# Patient Record
Sex: MALE | Race: WHITE
[De-identification: names, ages, dates, MRNs, and addresses within clinical notes are randomized per-mention and may not be internally consistent; named-entity substitution may affect disease eponyms.]

---

## 2019-12-30 ENCOUNTER — HOSPITAL ENCOUNTER (INPATIENT)
Dept: HOSPITAL 63 - ER | Age: 79
LOS: 30 days | Discharge: HOSPICE | DRG: 57 | End: 2020-01-29
Attending: PSYCHIATRY & NEUROLOGY | Admitting: PSYCHIATRY & NEUROLOGY
Payer: MEDICARE

## 2019-12-30 VITALS — BODY MASS INDEX: 20.16 KG/M2 | HEIGHT: 74 IN | WEIGHT: 157.12 LBS

## 2019-12-30 VITALS — DIASTOLIC BLOOD PRESSURE: 74 MMHG | SYSTOLIC BLOOD PRESSURE: 138 MMHG

## 2019-12-30 DIAGNOSIS — F32.9: ICD-10-CM

## 2019-12-30 DIAGNOSIS — Z91.81: ICD-10-CM

## 2019-12-30 DIAGNOSIS — F63.9: ICD-10-CM

## 2019-12-30 DIAGNOSIS — I48.0: ICD-10-CM

## 2019-12-30 DIAGNOSIS — F01.51: ICD-10-CM

## 2019-12-30 DIAGNOSIS — E86.0: ICD-10-CM

## 2019-12-30 DIAGNOSIS — J44.9: ICD-10-CM

## 2019-12-30 DIAGNOSIS — M19.90: ICD-10-CM

## 2019-12-30 DIAGNOSIS — H90.3: ICD-10-CM

## 2019-12-30 DIAGNOSIS — G20: ICD-10-CM

## 2019-12-30 DIAGNOSIS — Z79.82: ICD-10-CM

## 2019-12-30 DIAGNOSIS — F41.9: ICD-10-CM

## 2019-12-30 DIAGNOSIS — F02.81: ICD-10-CM

## 2019-12-30 DIAGNOSIS — Z79.899: ICD-10-CM

## 2019-12-30 DIAGNOSIS — H54.7: ICD-10-CM

## 2019-12-30 DIAGNOSIS — I10: ICD-10-CM

## 2019-12-30 DIAGNOSIS — G30.9: Primary | ICD-10-CM

## 2019-12-30 DIAGNOSIS — L03.113: ICD-10-CM

## 2019-12-30 DIAGNOSIS — Z66: ICD-10-CM

## 2019-12-30 DIAGNOSIS — Z51.5: ICD-10-CM

## 2019-12-30 LAB
ALBUMIN SERPL-MCNC: 3.5 G/DL (ref 3.4–5)
ALBUMIN/GLOB SERPL: 1 {RATIO} (ref 1–1.7)
ALP SERPL-CCNC: 81 U/L (ref 46–116)
ALT SERPL-CCNC: 24 U/L (ref 16–63)
AMPHETAMINE/METHAMPHETAMINE: (no result)
ANION GAP SERPL CALC-SCNC: 9 MMOL/L (ref 6–14)
APTT PPP: YELLOW S
AST SERPL-CCNC: 25 U/L (ref 15–37)
BACTERIA #/AREA URNS HPF: 0 /HPF
BARBITURATES UR-MCNC: (no result) UG/ML
BASOPHILS # BLD AUTO: 0.2 X10^3/UL (ref 0–0.2)
BASOPHILS NFR BLD: 2 % (ref 0–3)
BENZODIAZ UR-MCNC: (no result) UG/L
BILIRUB SERPL-MCNC: 1 MG/DL (ref 0.2–1)
BILIRUB UR QL STRIP: (no result)
BUN/CREAT SERPL: 20 (ref 6–20)
CA-I SERPL ISE-MCNC: 32 MG/DL (ref 8–26)
CALCIUM SERPL-MCNC: 8.6 MG/DL (ref 8.5–10.1)
CANNABINOIDS UR-MCNC: (no result) UG/L
CHLORIDE SERPL-SCNC: 110 MMOL/L (ref 98–107)
CO2 SERPL-SCNC: 26 MMOL/L (ref 21–32)
COCAINE UR-MCNC: (no result) NG/ML
CREAT SERPL-MCNC: 1.6 MG/DL (ref 0.7–1.3)
EOSINOPHIL NFR BLD: 0.1 X10^3/UL (ref 0–0.7)
EOSINOPHIL NFR BLD: 2 % (ref 0–3)
ERYTHROCYTE [DISTWIDTH] IN BLOOD BY AUTOMATED COUNT: 14.2 % (ref 11.5–14.5)
FIBRINOGEN PPP-MCNC: CLEAR MG/DL
GFR SERPLBLD BASED ON 1.73 SQ M-ARVRAT: 41.8 ML/MIN
GLOBULIN SER-MCNC: 3.6 G/DL (ref 2.2–3.8)
GLUCOSE SERPL-MCNC: 90 MG/DL (ref 70–99)
GLUCOSE UR STRIP-MCNC: (no result) MG/DL
HCT VFR BLD CALC: 41.8 % (ref 39–53)
HGB BLD-MCNC: 14.4 G/DL (ref 13–17.5)
LYMPHOCYTES # BLD: 1.9 X10^3/UL (ref 1–4.8)
LYMPHOCYTES NFR BLD AUTO: 23 % (ref 24–48)
MCH RBC QN AUTO: 31 PG (ref 25–35)
MCHC RBC AUTO-ENTMCNC: 35 G/DL (ref 31–37)
MCV RBC AUTO: 89 FL (ref 79–100)
METHADONE SERPL-MCNC: (no result) NG/ML
MONO #: 0.7 X10^3/UL (ref 0–1.1)
MONOCYTES NFR BLD: 9 % (ref 0–9)
NEUT #: 5.5 X10^3UL (ref 1.8–7.7)
NEUTROPHILS NFR BLD AUTO: 65 % (ref 31–73)
NITRITE UR QL STRIP: (no result)
OPIATES UR-MCNC: (no result) NG/ML
PCP SERPL-MCNC: (no result) MG/DL
PLATELET # BLD AUTO: 201 X10^3/UL (ref 140–400)
POTASSIUM SERPL-SCNC: 4 MMOL/L (ref 3.5–5.1)
PROT SERPL-MCNC: 7.1 G/DL (ref 6.4–8.2)
RBC # BLD AUTO: 4.72 X10^6/UL (ref 4.3–5.7)
RBC #/AREA URNS HPF: (no result) /HPF (ref 0–2)
SODIUM SERPL-SCNC: 145 MMOL/L (ref 136–145)
SP GR UR STRIP: >=1.03
SQUAMOUS #/AREA URNS LPF: (no result) /LPF
UROBILINOGEN UR-MCNC: 0.2 MG/DL
WBC # BLD AUTO: 8.5 X10^3/UL (ref 4–11)
WBC #/AREA URNS HPF: (no result) /HPF (ref 0–4)

## 2019-12-30 PROCEDURE — 86592 SYPHILIS TEST NON-TREP QUAL: CPT

## 2019-12-30 PROCEDURE — 82306 VITAMIN D 25 HYDROXY: CPT

## 2019-12-30 PROCEDURE — 81001 URINALYSIS AUTO W/SCOPE: CPT

## 2019-12-30 PROCEDURE — 82607 VITAMIN B-12: CPT

## 2019-12-30 PROCEDURE — 93005 ELECTROCARDIOGRAM TRACING: CPT

## 2019-12-30 PROCEDURE — 80307 DRUG TEST PRSMV CHEM ANLYZR: CPT

## 2019-12-30 PROCEDURE — 83540 ASSAY OF IRON: CPT

## 2019-12-30 PROCEDURE — 86140 C-REACTIVE PROTEIN: CPT

## 2019-12-30 PROCEDURE — 73130 X-RAY EXAM OF HAND: CPT

## 2019-12-30 PROCEDURE — 80164 ASSAY DIPROPYLACETIC ACD TOT: CPT

## 2019-12-30 PROCEDURE — 84436 ASSAY OF TOTAL THYROXINE: CPT

## 2019-12-30 PROCEDURE — 87086 URINE CULTURE/COLONY COUNT: CPT

## 2019-12-30 PROCEDURE — 85651 RBC SED RATE NONAUTOMATED: CPT

## 2019-12-30 PROCEDURE — 84480 ASSAY TRIIODOTHYRONINE (T3): CPT

## 2019-12-30 PROCEDURE — 84443 ASSAY THYROID STIM HORMONE: CPT

## 2019-12-30 PROCEDURE — 83036 HEMOGLOBIN GLYCOSYLATED A1C: CPT

## 2019-12-30 PROCEDURE — 82140 ASSAY OF AMMONIA: CPT

## 2019-12-30 PROCEDURE — 87186 SC STD MICRODIL/AGAR DIL: CPT

## 2019-12-30 PROCEDURE — 85027 COMPLETE CBC AUTOMATED: CPT

## 2019-12-30 PROCEDURE — 85025 COMPLETE CBC W/AUTO DIFF WBC: CPT

## 2019-12-30 PROCEDURE — 83550 IRON BINDING TEST: CPT

## 2019-12-30 PROCEDURE — 83735 ASSAY OF MAGNESIUM: CPT

## 2019-12-30 PROCEDURE — 80053 COMPREHEN METABOLIC PANEL: CPT

## 2019-12-30 PROCEDURE — 36415 COLL VENOUS BLD VENIPUNCTURE: CPT

## 2019-12-30 PROCEDURE — 73080 X-RAY EXAM OF ELBOW: CPT

## 2019-12-30 PROCEDURE — 82947 ASSAY GLUCOSE BLOOD QUANT: CPT

## 2019-12-30 PROCEDURE — 80061 LIPID PANEL: CPT

## 2019-12-30 PROCEDURE — P9612 CATHETERIZE FOR URINE SPEC: HCPCS

## 2019-12-30 RX ADMIN — DONEPEZIL HYDROCHLORIDE SCH MG: 10 TABLET ORAL at 20:35

## 2019-12-30 RX ADMIN — MAGNESIUM HYDROXIDE SCH MG: 400 SUSPENSION ORAL at 20:35

## 2019-12-30 RX ADMIN — MIRTAZAPINE SCH MG: 7.5 TABLET, FILM COATED ORAL at 20:33

## 2019-12-30 RX ADMIN — ACETAMINOPHEN SCH MG: 160 SOLUTION ORAL at 20:35

## 2019-12-30 RX ADMIN — LOSARTAN POTASSIUM SCH MG: 50 TABLET, FILM COATED ORAL at 20:34

## 2019-12-30 RX ADMIN — MELATONIN TAB 3 MG SCH MG: 3 TAB at 20:33

## 2019-12-30 NOTE — PHYS DOC
Adult General


Chief Complaint


Chief Complaint:  MEDICAL CLEARANCE





[facility]


HPI





Patient is an 79-year-old male who presents for medical clearance for senior 

behavioral health for reported behavioral disturbances. Patient reportedly 

getting more aggressive with staff and other patients. Additional history is bardales

ited as patient is not answering questions.[]





Review of Systems


Review of Systems





Constitutional: Denies fever or chills []


Respiratory: Denies cough or shortness of breath []


Cardiovascular: No additional information not addressed in HPI []


GI: No reported vomiting or diarrhea []


Integument: Denies rash or skin lesions []


Neurologic: Denies headache, focal weakness or sensory changes []





Unable to assess for full review of systems as patient is not answering 

questions.





Physical Exam


Physical Exam





Constitutional: Well developed, well nourished, no acute distress, non-toxic 

appearance. []


HENT: Normocephalic, atraumatic, bilateral external ears normal, oropharynx 

moist, no oral exudates, nose normal. []


Eyes: PERRLA, EOMI, conjunctiva normal, no discharge. [] 


Neck: Normal range of motion, no tenderness, supple, no stridor. [] 


Cardiovascular: Regular rate and rhythm[]


Lungs & Thorax:  Bilateral breath sounds clear to auscultation []


Abdomen: Bowel sounds normal, soft, no tenderness. [] 


Skin: Warm, dry, no erythema, no rash. [] 


Extremities: No tenderness, no cyanosis, no clubbing, ROM intact. [] 


Neurologic: Somnolent but arousable, no focal deficits noted. []





EKG


EKG


[]





Radiology/Procedures


Radiology/Procedures


[]





Course & Med Decision Making


Course & Med Decision Making


Pertinent Labs and Imaging studies reviewed. (See chart for details)





[]





Dragon Disclaimer


Dragon Disclaimer


This electronic medical record was generated, in whole or in part, using a voice

 recognition dictation system.





Departure


Departure:


Impression:  


   Primary Impression:  


   Dementia with behavioral disturbance


Disposition:  09 ADMITTED AS INPATIENT


Admitting Physician:  Other (Dr. Valentine)


Condition:  STABLE


Referrals:  


HERACLIO MARSH (PCP)





Problem Qualifiers








   Primary Impression:  


   Dementia with behavioral disturbance


   Dementia type:  unspecified type  Qualified Codes:  F03.91 - Unspecified 

   dementia with behavioral disturbance








SARA CARVALHO Jr. DO          Dec 30, 2019 16:09

## 2019-12-30 NOTE — EKG
[facility] [address], [place] [address]

Test Date:    2019               Test Time:    16:20:30

Pat Name:     SMILEY PILLAI              Department:   

Patient ID:   [id number]           Room:          

Gender:       M                        Technician:   

:          [id number]               Requested By: SARA CARVALHO

Order Number: [id number]            Reading MD:   Luis Justice MD

                                 Measurements

Intervals                              Axis          

Rate:         66                       P:            90

MN:           198                      QRS:          -47

QRSD:         132                      T:            47

QT:           450                                    

QTc:          474                                    

                           Interpretive Statements

SINUS RHYTHM

RBBB

LAD

Electronically Signed On 2020 10:37:51 CST by Luis Justice MD

## 2019-12-30 NOTE — PDOC
Exam


Note:


George Note:


Please also refer to the separate dictated note~for this date of service 

dictated separately. Discussed the patient with Nursing staff reviewed the 

chart.~Reviewed interim history and current functioning. Reviewed vital 

signs,~Labs/ Radiology~and current medications noted below. Continue current 

treatment with the changes noted in the dictated addendum note





Assessment:


Vital Signs/I&O:





                                   Vital Signs








  Date Time  Temp Pulse Resp B/P (MAP) Pulse Ox O2 Delivery O2 Flow Rate FiO2


 


12/30/19 20:34  64  138/74    


 


12/30/19 18:25 97.2  20  95   


 


12/30/19 15:35      Room Air  








Labs:





                                Laboratory Tests








Test


 12/30/19


15:51 12/30/19


16:25


 


White Blood Count


 8.5 x10^3/uL


(4.0-11.0) 





 


Red Blood Count


 4.72 x10^6/uL


(4.30-5.70) 





 


Hemoglobin


 14.4 g/dL


(13.0-17.5) 





 


Hematocrit


 41.8 %


(39.0-53.0) 





 


Mean Corpuscular Volume


 89 fL ()


 





 


Mean Corpuscular Hemoglobin 31 pg (25-35)   


 


Mean Corpuscular Hemoglobin


Concent 35 g/dL


(31-37) 





 


Red Cell Distribution Width


 14.2 %


(11.5-14.5) 





 


Platelet Count


 201 x10^3/uL


(140-400) 





 


Neutrophils (%) (Auto) 65 % (31-73)   


 


Lymphocytes (%) (Auto) 23 % (24-48)  L 


 


Monocytes (%) (Auto) 9 % (0-9)   


 


Eosinophils (%) (Auto) 2 % (0-3)   


 


Basophils (%) (Auto) 2 % (0-3)   


 


Neutrophils # (Auto)


 5.5 x10^3uL


(1.8-7.7) 





 


Lymphocytes # (Auto)


 1.9 x10^3/uL


(1.0-4.8) 





 


Monocytes # (Auto)


 0.7 x10^3/uL


(0.0-1.1) 





 


Eosinophils # (Auto)


 0.1 x10^3/uL


(0.0-0.7) 





 


Basophils # (Auto)


 0.2 x10^3/uL


(0.0-0.2) 





 


Sodium Level


 145 mmol/L


(136-145) 





 


Potassium Level


 4.0 mmol/L


(3.5-5.1) 





 


Chloride Level


 110 mmol/L


()  H 





 


Carbon Dioxide Level


 26 mmol/L


(21-32) 





 


Anion Gap 9 (6-14)   


 


Blood Urea Nitrogen


 32 mg/dL


(8-26)  H 





 


Creatinine


 1.6 mg/dL


(0.7-1.3)  H 





 


Estimated GFR


(Cockcroft-Gault) 41.8  


 





 


BUN/Creatinine Ratio 20 (6-20)   


 


Glucose Level


 90 mg/dL


(70-99) 





 


Calcium Level


 8.6 mg/dL


(8.5-10.1) 





 


Magnesium Level


 2.2 mg/dL


(1.8-2.4) 





 


Total Bilirubin


 1.0 mg/dL


(0.2-1.0) 





 


Aspartate Amino Transferase


(AST) 25 U/L (15-37)


 





 


Alanine Aminotransferase (ALT)


 24 U/L (16-63)


 





 


Alkaline Phosphatase


 81 U/L


() 





 


Total Protein


 7.1 g/dL


(6.4-8.2) 





 


Albumin


 3.5 g/dL


(3.4-5.0) 





 


Albumin/Globulin Ratio 1.0 (1.0-1.7)   


 


Ethyl Alcohol Level


 < 10 mg/dL


(0-10) 





 


Urine Collection Type  U cath  


 


Urine Color  Yellow  


 


Urine Clarity  Clear  


 


Urine pH  5.5  


 


Urine Specific Gravity  >=1.030  


 


Urine Protein


 


 Neg


(NEG-TRACE)


 


Urine Glucose (UA)


 


 Neg mg/dL


(NEG)


 


Urine Ketones (Stick)


 


 Neg mg/dL


(NEG)


 


Urine Blood  Trace (NEG)  


 


Urine Nitrite  Neg (NEG)  


 


Urine Bilirubin  Neg (NEG)  


 


Urine Urobilinogen Dipstick


 


 0.2 mg/dL (0.2


mg/dL)


 


Urine Leukocyte Esterase  Neg (NEG)  


 


Urine RBC


 


 1-2 /HPF (0-2)





 


Urine WBC


 


 1-4 /HPF (0-4)





 


Urine Squamous Epithelial


Cells 


 None /LPF  





 


Urine Transitional Epithelial


Cells 


 Few /LPF  





 


Urine Bacteria


 


 0 /HPF (0-FEW)





 


Urine Mucus  Slight /LPF  


 


Urine Opiates Screen  Neg (NEG)  


 


Urine Methadone Screen  Neg (NEG)  


 


Urine Barbiturates  Neg (NEG)  


 


Urine Phencyclidine Screen  Neg (NEG)  


 


Urine


Amphetamine/Methamphetamine 


 Neg (NEG)  





 


Urine Benzodiazepines Screen  Neg (NEG)  


 


Urine Cocaine Screen  Neg (NEG)  


 


Urine Cannabinoids Screen  Neg (NEG)  


 


Urine Ethyl Alcohol  Neg (NEG)  











Current Medications:


Meds:





Current Medications








 Medications


  (Trade)  Dose


 Ordered  Sig/Lamberto


 Route


 PRN Reason  Start Time


 Stop Time Status Last Admin


Dose Admin


 


 Acetaminophen


  (Tylenol Oral


 Soln)  650 mg  BID


 PO


   12/30/19 21:00


    12/30/19 20:35





 


 Donepezil HCl


  (Aricept)  22.5 mg  QHS


 PO


   12/30/19 21:00


    12/30/19 20:35





 


 Melatonin


  (Melatonin)  3 mg  QHS


 PO


   12/30/19 21:00


    12/30/19 20:33





 


 Mirtazapine


  (Remeron)  7.5 mg  QHS


 PO


   12/30/19 21:00


    12/30/19 20:33





 


 Olanzapine


  (ZyPREXA)  2.5 mg  BID


 PO


   12/30/19 21:00


    12/30/19 20:33





 


 Losartan Potassium


  (Cozaar)  50 mg  QHS


 PO


   12/30/19 21:00


    12/30/19 20:34





 


 Hydrochlorothiazide


  (Microzide)  12.5 mg  QHS


 PO


   12/30/19 21:00


    12/30/19 20:34











I have reviewed the current psychotropics carefully including drug interactions.

 Risk benefit ratio favors no change other than as noted in my dictated progress

note.





Diagnosis:


Problems:  


(1) Anxiety disorder


(2) Dementia with behavioral disturbance


(3) Dementia, vascular, with delusions


(4) Dementia, vascular, with depression


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


(7) Impulse control disorder











NIKHIL LAUREANO MD                 Dec 30, 2019 21:09

## 2019-12-31 VITALS — DIASTOLIC BLOOD PRESSURE: 81 MMHG | SYSTOLIC BLOOD PRESSURE: 140 MMHG

## 2019-12-31 LAB
CHOLEST/HDLC SERPL: 3 {RATIO}
HBA1C MFR BLD: 5.1 % (ref 4.8–5.6)
HDLC SERPL-MCNC: 43 MG/DL (ref 40–60)
LDLC: 97 MG/DL (ref 0–100)
THYROID STIM HORMONE (TSH): 2.1 UIU/ML (ref 0.36–3.74)
TRIGL SERPL-MCNC: 86 MG/DL (ref 0–150)
VLDLC: 17 MG/DL (ref 0–40)

## 2019-12-31 RX ADMIN — ACETAMINOPHEN SCH MG: 160 SOLUTION ORAL at 10:15

## 2019-12-31 RX ADMIN — DONEPEZIL HYDROCHLORIDE SCH MG: 10 TABLET ORAL at 21:01

## 2019-12-31 RX ADMIN — LOSARTAN POTASSIUM SCH MG: 50 TABLET, FILM COATED ORAL at 21:02

## 2019-12-31 RX ADMIN — MIRTAZAPINE SCH MG: 7.5 TABLET, FILM COATED ORAL at 21:01

## 2019-12-31 RX ADMIN — MAGNESIUM HYDROXIDE SCH MG: 400 SUSPENSION ORAL at 21:03

## 2019-12-31 RX ADMIN — MELATONIN TAB 3 MG SCH MG: 3 TAB at 21:02

## 2019-12-31 RX ADMIN — MEMANTINE HYDROCHLORIDE SCH MG: 10 TABLET ORAL at 21:02

## 2019-12-31 RX ADMIN — ASPIRIN 81 MG SCH MG: 81 TABLET ORAL at 10:15

## 2019-12-31 RX ADMIN — MEMANTINE HYDROCHLORIDE SCH MG: 10 TABLET ORAL at 10:14

## 2019-12-31 RX ADMIN — POTASSIUM CHLORIDE SCH MEQ: 1500 TABLET, EXTENDED RELEASE ORAL at 10:14

## 2019-12-31 RX ADMIN — ACETAMINOPHEN SCH MG: 160 SOLUTION ORAL at 21:02

## 2019-12-31 NOTE — PDOC
Exam


Note:


George Note:


Please also refer to the separate dictated note~for this date of service 

dictated separately.~Patient seen individually. Discussed the patient with 

Nursing staff reviewed the chart.~Reviewed interim history and current 

functioning. Reviewed vital signs,~Labs/ Radiology~and current medications noted

below. Continue current treatment with the changes noted in the dictated 

addendum note





Assessment:


Vital Signs/I&O:





                                   Vital Signs








  Date Time  Temp Pulse Resp B/P (MAP) Pulse Ox O2 Delivery O2 Flow Rate FiO2


 


12/31/19 21:02  47  140/81    


 


12/31/19 05:15 97.0  16  98   


 


12/30/19 15:35      Room Air  














                                    I & O   


 


 12/30/19 12/30/19 12/31/19





 15:00 23:00 07:00


 


Intake Total  0 ml 


 


Balance  0 ml 








Labs:





                                Laboratory Tests








Test


 12/31/19


19:31


 


Glucose (Fingerstick)


 84 mg/dL


(70-99)











Current Medications:


Meds:





Current Medications








 Medications


  (Trade)  Dose


 Ordered  Sig/Lamberto


 Route


 PRN Reason  Start Time


 Stop Time Status Last Admin


Dose Admin


 


 Amlodipine


 Besylate


  (Norvasc)  5 mg  DAILY


 PO


   12/31/19 09:00


    12/31/19 10:15





 


 Aspirin


  (Children'S


 Aspirin)  162 mg  DAILY


 PO


   12/31/19 09:00


    12/31/19 10:15





 


 Potassium Chloride


  (Klor-Con)  20 meq  DAILY


 PO


   12/31/19 09:00


    12/31/19 10:14





 


 Memantine


  (Namenda)  10 mg  BID


 PO


   12/31/19 09:00


    12/31/19 21:02











I have reviewed the current psychotropics carefully including drug interactions.

 Risk benefit ratio favors no change other than as noted in my dictated progress

note.





Diagnosis:


Problems:  


(1) Dementia with behavioral disturbance


(2) Anxiety disorder


(3) Dementia, vascular, with delusions


(4) Dementia, vascular, with depression


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


(7) Impulse control disorder











NIKHIL LAUREANO MD                 Dec 31, 2019 21:26
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
## 2020-01-01 VITALS — SYSTOLIC BLOOD PRESSURE: 131 MMHG | DIASTOLIC BLOOD PRESSURE: 78 MMHG

## 2020-01-01 VITALS — DIASTOLIC BLOOD PRESSURE: 53 MMHG | SYSTOLIC BLOOD PRESSURE: 109 MMHG

## 2020-01-01 LAB
T3 SERPL-MCNC: 73 NG/DL (ref 71–180)
T4 SERPL-MCNC: 6.8 UG/DL (ref 4.5–12)

## 2020-01-01 RX ADMIN — MAGNESIUM HYDROXIDE SCH MG: 400 SUSPENSION ORAL at 19:42

## 2020-01-01 RX ADMIN — DONEPEZIL HYDROCHLORIDE SCH MG: 10 TABLET ORAL at 19:42

## 2020-01-01 RX ADMIN — ASPIRIN 81 MG SCH MG: 81 TABLET ORAL at 08:49

## 2020-01-01 RX ADMIN — TRAZODONE HYDROCHLORIDE PRN MG: 50 TABLET, FILM COATED ORAL at 01:13

## 2020-01-01 RX ADMIN — LOSARTAN POTASSIUM SCH MG: 50 TABLET, FILM COATED ORAL at 19:54

## 2020-01-01 RX ADMIN — MEMANTINE HYDROCHLORIDE SCH MG: 10 TABLET ORAL at 19:42

## 2020-01-01 RX ADMIN — ACETAMINOPHEN SCH MG: 160 SOLUTION ORAL at 19:42

## 2020-01-01 RX ADMIN — SERTRALINE SCH MG: 25 TABLET, FILM COATED ORAL at 08:52

## 2020-01-01 RX ADMIN — MIRTAZAPINE SCH MG: 7.5 TABLET, FILM COATED ORAL at 19:43

## 2020-01-01 RX ADMIN — LOSARTAN POTASSIUM SCH MG: 50 TABLET, FILM COATED ORAL at 19:46

## 2020-01-01 RX ADMIN — POTASSIUM CHLORIDE SCH MEQ: 1500 TABLET, EXTENDED RELEASE ORAL at 08:49

## 2020-01-01 RX ADMIN — LOSARTAN POTASSIUM SCH MG: 50 TABLET, FILM COATED ORAL at 19:44

## 2020-01-01 RX ADMIN — MELATONIN TAB 3 MG SCH MG: 3 TAB at 19:44

## 2020-01-01 RX ADMIN — ACETAMINOPHEN SCH MG: 160 SOLUTION ORAL at 08:49

## 2020-01-01 RX ADMIN — MEMANTINE HYDROCHLORIDE SCH MG: 10 TABLET ORAL at 08:49

## 2020-01-01 NOTE — PDOC
Exam


Note:


George Note:


Please also refer to the separate dictated note~for this date of service 

dictated separately.~Patient seen individually. Discussed the patient with 

Nursing staff reviewed the chart.~Reviewed interim history and current 

functioning. Reviewed vital signs,~Labs/ Radiology~and current medications noted

below. Continue current treatment with the changes noted in the dictated 

addendum note





Assessment:


Vital Signs/I&O:





                                   Vital Signs








  Date Time  Temp Pulse Resp B/P (MAP) Pulse Ox O2 Delivery O2 Flow Rate FiO2


 


1/1/20 19:54    104/66    


 


1/1/20 15:39 97.6 75 18  96   


 


1/1/20 05:08      Room Air  














                                    I & O   


 


 12/31/19 12/31/19 1/1/20





 14:59 22:59 06:59


 


Intake Total 420 ml 0 ml 


 


Balance 420 ml 0 ml 











Current Medications:


Meds:





Current Medications








 Medications


  (Trade)  Dose


 Ordered  Sig/Lamberto


 Route


 PRN Reason  Start Time


 Stop Time Status Last Admin


Dose Admin


 


 Sertraline HCl


  (Zoloft)  25 mg  DAILY


 PO


   1/1/20 09:00


 1/4/20 08:59  1/1/20 08:52











I have reviewed the current psychotropics carefully including drug interactions.

 Risk benefit ratio favors no change other than as noted in my dictated progress

note.





Diagnosis:


Problems:  


(1) Dementia with behavioral disturbance


(2) Anxiety disorder


(3) Dementia, vascular, with delusions


(4) Dementia, vascular, with depression


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


(7) Impulse control disorder











NIKHIL LAUREANO MD                  Jan 1, 2020 20:49
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
## 2020-01-02 VITALS — DIASTOLIC BLOOD PRESSURE: 72 MMHG | SYSTOLIC BLOOD PRESSURE: 151 MMHG

## 2020-01-02 VITALS — DIASTOLIC BLOOD PRESSURE: 73 MMHG | SYSTOLIC BLOOD PRESSURE: 111 MMHG

## 2020-01-02 VITALS — DIASTOLIC BLOOD PRESSURE: 73 MMHG | SYSTOLIC BLOOD PRESSURE: 115 MMHG

## 2020-01-02 RX ADMIN — MAGNESIUM HYDROXIDE SCH MG: 400 SUSPENSION ORAL at 19:48

## 2020-01-02 RX ADMIN — MEMANTINE HYDROCHLORIDE SCH MG: 10 TABLET ORAL at 19:48

## 2020-01-02 RX ADMIN — POTASSIUM CHLORIDE SCH MEQ: 1500 TABLET, EXTENDED RELEASE ORAL at 10:33

## 2020-01-02 RX ADMIN — LOSARTAN POTASSIUM SCH MG: 50 TABLET, FILM COATED ORAL at 19:47

## 2020-01-02 RX ADMIN — MELATONIN TAB 3 MG SCH MG: 3 TAB at 19:47

## 2020-01-02 RX ADMIN — MIRTAZAPINE SCH MG: 7.5 TABLET, FILM COATED ORAL at 19:48

## 2020-01-02 RX ADMIN — ACETAMINOPHEN SCH MG: 160 SOLUTION ORAL at 10:33

## 2020-01-02 RX ADMIN — MEMANTINE HYDROCHLORIDE SCH MG: 10 TABLET ORAL at 10:34

## 2020-01-02 RX ADMIN — ASPIRIN 81 MG SCH MG: 81 TABLET ORAL at 10:33

## 2020-01-02 RX ADMIN — DONEPEZIL HYDROCHLORIDE SCH MG: 10 TABLET ORAL at 19:47

## 2020-01-02 RX ADMIN — SERTRALINE SCH MG: 25 TABLET, FILM COATED ORAL at 10:33

## 2020-01-02 RX ADMIN — ACETAMINOPHEN SCH MG: 160 SOLUTION ORAL at 19:48

## 2020-01-02 NOTE — PDOC
Exam


Note:


George Note:


Please also refer to the separate dictated note~for this date of service 

dictated separately.~Patient seen individually. Discussed the patient with 

Nursing staff reviewed the chart.~Reviewed interim history and current 

functioning. Reviewed vital signs,~Labs/ Radiology~and current medications noted

below. Continue current treatment with the changes noted in the dictated 

addendum note





Assessment:


Vital Signs/I&O:





                                   Vital Signs








  Date Time  Temp Pulse Resp B/P (MAP) Pulse Ox O2 Delivery O2 Flow Rate FiO2


 


1/2/20 19:47  83  115/73    


 


1/2/20 16:18 97.9  20  96 Room Air  














                                    I & O   


 


 1/1/20 1/1/20 1/2/20





 15:00 23:00 07:00


 


Intake Total 240 ml 240 ml 240 ml


 


Balance 240 ml 240 ml 240 ml











Current Medications:


I have reviewed the current psychotropics carefully including drug interactions.

 Risk benefit ratio favors no change other than as noted in my dictated progress

note.





Diagnosis:


Problems:  


(1) Dementia with behavioral disturbance


(2) Anxiety disorder


(3) Dementia, vascular, with delusions


(4) Dementia, vascular, with depression


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


(7) Impulse control disorder











NIKHIL LAUREANO MD                  Jan 2, 2020 21:09

## 2020-01-03 VITALS — SYSTOLIC BLOOD PRESSURE: 151 MMHG | DIASTOLIC BLOOD PRESSURE: 74 MMHG

## 2020-01-03 VITALS — DIASTOLIC BLOOD PRESSURE: 69 MMHG | SYSTOLIC BLOOD PRESSURE: 145 MMHG

## 2020-01-03 LAB
ALBUMIN SERPL-MCNC: 3.3 G/DL (ref 3.4–5)
ALBUMIN/GLOB SERPL: 0.9 {RATIO} (ref 1–1.7)
ALP SERPL-CCNC: 80 U/L (ref 46–116)
ALT SERPL-CCNC: 20 U/L (ref 16–63)
ANION GAP SERPL CALC-SCNC: 7 MMOL/L (ref 6–14)
AST SERPL-CCNC: 22 U/L (ref 15–37)
BASOPHILS # BLD AUTO: 0.1 X10^3/UL (ref 0–0.2)
BASOPHILS NFR BLD: 1 % (ref 0–3)
BILIRUB SERPL-MCNC: 0.7 MG/DL (ref 0.2–1)
BUN/CREAT SERPL: 27 (ref 6–20)
CA-I SERPL ISE-MCNC: 40 MG/DL (ref 8–26)
CALCIUM SERPL-MCNC: 8.7 MG/DL (ref 8.5–10.1)
CHLORIDE SERPL-SCNC: 108 MMOL/L (ref 98–107)
CO2 SERPL-SCNC: 31 MMOL/L (ref 21–32)
CREAT SERPL-MCNC: 1.5 MG/DL (ref 0.7–1.3)
EOSINOPHIL NFR BLD: 0.3 X10^3/UL (ref 0–0.7)
EOSINOPHIL NFR BLD: 4 % (ref 0–3)
ERYTHROCYTE [DISTWIDTH] IN BLOOD BY AUTOMATED COUNT: 14.2 % (ref 11.5–14.5)
GFR SERPLBLD BASED ON 1.73 SQ M-ARVRAT: 45 ML/MIN
GLOBULIN SER-MCNC: 3.7 G/DL (ref 2.2–3.8)
GLUCOSE SERPL-MCNC: 124 MG/DL (ref 70–99)
HCT VFR BLD CALC: 44.3 % (ref 39–53)
HGB BLD-MCNC: 14.7 G/DL (ref 13–17.5)
LYMPHOCYTES # BLD: 1.6 X10^3/UL (ref 1–4.8)
LYMPHOCYTES NFR BLD AUTO: 20 % (ref 24–48)
MCH RBC QN AUTO: 30 PG (ref 25–35)
MCHC RBC AUTO-ENTMCNC: 33 G/DL (ref 31–37)
MCV RBC AUTO: 91 FL (ref 79–100)
MONO #: 0.4 X10^3/UL (ref 0–1.1)
MONOCYTES NFR BLD: 5 % (ref 0–9)
NEUT #: 5.7 X10^3UL (ref 1.8–7.7)
NEUTROPHILS NFR BLD AUTO: 71 % (ref 31–73)
PLATELET # BLD AUTO: 199 X10^3/UL (ref 140–400)
POTASSIUM SERPL-SCNC: 3.8 MMOL/L (ref 3.5–5.1)
PROT SERPL-MCNC: 7 G/DL (ref 6.4–8.2)
RBC # BLD AUTO: 4.86 X10^6/UL (ref 4.3–5.7)
SODIUM SERPL-SCNC: 146 MMOL/L (ref 136–145)
WBC # BLD AUTO: 8.1 X10^3/UL (ref 4–11)

## 2020-01-03 RX ADMIN — MAGNESIUM HYDROXIDE SCH MG: 400 SUSPENSION ORAL at 19:53

## 2020-01-03 RX ADMIN — ASPIRIN 81 MG SCH MG: 81 TABLET ORAL at 08:46

## 2020-01-03 RX ADMIN — MELATONIN TAB 3 MG SCH MG: 3 TAB at 19:53

## 2020-01-03 RX ADMIN — SERTRALINE SCH MG: 25 TABLET, FILM COATED ORAL at 08:46

## 2020-01-03 RX ADMIN — TRAZODONE HYDROCHLORIDE PRN MG: 50 TABLET, FILM COATED ORAL at 19:52

## 2020-01-03 RX ADMIN — ACETAMINOPHEN SCH MG: 160 SOLUTION ORAL at 20:00

## 2020-01-03 RX ADMIN — POTASSIUM CHLORIDE SCH MEQ: 1500 TABLET, EXTENDED RELEASE ORAL at 08:46

## 2020-01-03 RX ADMIN — MIRTAZAPINE SCH MG: 7.5 TABLET, FILM COATED ORAL at 19:53

## 2020-01-03 RX ADMIN — ACETAMINOPHEN SCH MG: 160 SOLUTION ORAL at 08:44

## 2020-01-03 RX ADMIN — MEMANTINE HYDROCHLORIDE SCH MG: 10 TABLET ORAL at 08:46

## 2020-01-03 RX ADMIN — MEMANTINE HYDROCHLORIDE SCH MG: 10 TABLET ORAL at 19:53

## 2020-01-03 RX ADMIN — DONEPEZIL HYDROCHLORIDE SCH MG: 10 TABLET ORAL at 19:53

## 2020-01-03 NOTE — PN
DATE:  12/31/2019



PSYCHIATRIC PROGRESS NOTE



This late entry date of service 12/31/2019 covers the elements not covered in my

initial note.



SUBJECTIVE:  Per DERIAN Bailey, the patient slept 7 hours previous night.  He

remains in onesie, combative at times.  At 6:15, he received Ativan for this. 

We will change the Namenda XR to Namenda 10 mg twice a day.



REVIEW OF SYSTEMS:  No CV, , pulmonary, eye, ENT system symptoms on review. 

Extremely hard of hearing.



MENTAL STATUS EXAM:  Oriented to himself.  Insight, judgment, recent, and remote

memory, attention, concentration, fund of knowledge poor, consistent with his

diagnosis mentioned in my initial note.



PLAN:  Start Zoloft 25 mg a day and in 3 days, increase to 50 mg a day, change

Namenda as above, maintain Zyprexa 2.5 b.i.d., melatonin 3 mg at bedtime.  Rest

unchanged for now.





______________________________

MAN DELVIS LAUREANO MD



DR:  HARRY/ashly  JOB#:  [id number] / [id number]

DD:  01/02/2020 21:33  DT:  01/03/2020 01:13

## 2020-01-03 NOTE — PDOC
Exam


Note:


George Note:


Please also refer to the separate dictated note~for this date of service 

dictated separately.~Patient seen individually. Discussed the patient with 

Nursing staff reviewed the chart.~Reviewed interim history and current 

functioning. Reviewed vital signs,~Labs/ Radiology~and current medications noted

below. Continue current treatment with the changes noted in the dictated 

addendum note





Assessment:


Vital Signs/I&O:





                                   Vital Signs








  Date Time  Temp Pulse Resp B/P (MAP) Pulse Ox O2 Delivery O2 Flow Rate FiO2


 


1/3/20 15:45 98.0 57 16 151/74 (99) 96   


 


1/3/20 04:49      Room Air  














                                    I & O   


 


 1/2/20 1/2/20 1/3/20





 15:00 23:00 07:00


 


Intake Total 480 ml 600 ml 


 


Balance 480 ml 600 ml 








Labs:





                                Laboratory Tests








Test


 1/3/20


09:11


 


White Blood Count


 8.1 x10^3/uL


(4.0-11.0)


 


Red Blood Count


 4.86 x10^6/uL


(4.30-5.70)


 


Hemoglobin


 14.7 g/dL


(13.0-17.5)


 


Hematocrit


 44.3 %


(39.0-53.0)


 


Mean Corpuscular Volume


 91 fL ()





 


Mean Corpuscular Hemoglobin 30 pg (25-35)  


 


Mean Corpuscular Hemoglobin


Concent 33 g/dL


(31-37)


 


Red Cell Distribution Width


 14.2 %


(11.5-14.5)


 


Platelet Count


 199 x10^3/uL


(140-400)


 


Neutrophils (%) (Auto) 71 % (31-73)  


 


Lymphocytes (%) (Auto) 20 % (24-48)  L


 


Monocytes (%) (Auto) 5 % (0-9)  


 


Eosinophils (%) (Auto) 4 % (0-3)  H


 


Basophils (%) (Auto) 1 % (0-3)  


 


Neutrophils # (Auto)


 5.7 x10^3uL


(1.8-7.7)


 


Lymphocytes # (Auto)


 1.6 x10^3/uL


(1.0-4.8)


 


Monocytes # (Auto)


 0.4 x10^3/uL


(0.0-1.1)


 


Eosinophils # (Auto)


 0.3 x10^3/uL


(0.0-0.7)


 


Basophils # (Auto)


 0.1 x10^3/uL


(0.0-0.2)


 


Sodium Level


 146 mmol/L


(136-145)  H


 


Potassium Level


 3.8 mmol/L


(3.5-5.1)


 


Chloride Level


 108 mmol/L


()  H


 


Carbon Dioxide Level


 31 mmol/L


(21-32)


 


Anion Gap 7 (6-14)  


 


Blood Urea Nitrogen


 40 mg/dL


(8-26)  H


 


Creatinine


 1.5 mg/dL


(0.7-1.3)  H


 


Estimated GFR


(Cockcroft-Gault) 45.0  





 


BUN/Creatinine Ratio 27 (6-20)  H


 


Glucose Level


 124 mg/dL


(70-99)  H


 


Calcium Level


 8.7 mg/dL


(8.5-10.1)


 


Total Bilirubin


 0.7 mg/dL


(0.2-1.0)


 


Aspartate Amino Transferase


(AST) 22 U/L (15-37)





 


Alanine Aminotransferase (ALT)


 20 U/L (16-63)





 


Alkaline Phosphatase


 80 U/L


()


 


Total Protein


 7.0 g/dL


(6.4-8.2)


 


Albumin


 3.3 g/dL


(3.4-5.0)  L


 


Albumin/Globulin Ratio


 0.9 (1.0-1.7)


L











Current Medications:


Meds:





Current Medications








 Medications


  (Trade)  Dose


 Ordered  Sig/Lamberto


 Route


 PRN Reason  Start Time


 Stop Time Status Last Admin


Dose Admin


 


 Olanzapine


  (ZyPREXA ZYDIS)  2.5 mg  PRN Q2HR  PRN


 PO


 PSYCHOSIS  1/3/20 11:45


    1/3/20 19:53











I have reviewed the current psychotropics carefully including drug interactions.

 Risk benefit ratio favors no change other than as noted in my dictated progress

note.





Diagnosis:


Problems:  


(1) Dementia with behavioral disturbance


(2) Anxiety disorder


(3) Dementia, vascular, with delusions


(4) Dementia, vascular, with depression


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


(7) Impulse control disorder











NIKHIL LAUREANO MD                  Silverio 3, 2020 20:59

## 2020-01-03 NOTE — PN
DATE:  01/02/2020



This late entry 01/02/2020 covers elements not covered in my initial note.



SUBJECTIVE:  I met with the patient evening of 01/02/2020.  Per DERIAN Howe,

the patient slept 6-3/4 hours previous night.  He had a fall at 1:00 p.m.  No

injuries noted, bumped his left elbow, refused to wheelchair and walker,

resistive to toileting and being dressed.



REVIEW OF SYSTEMS:  Hard of hearing.  No CV, , pulmonary, eye system symptoms

on review.  Reliability poor.



MENTAL STATUS EXAM:  Oriented to himself.  Insight, judgment, recent and remote

memory, attention, concentration, fund of knowledge poor, consistent with his

diagnosis mentioned in my initial note.



PLAN:  Gradually increase the Zoloft.  Maintain rest of the psychotropics

unchanged.  He is not on any benzodiazepine that would predispose him to falls,

we will continue to monitor this closely.





______________________________

NIKHIL LAUREANO MD



DR:  HARRY/ashly  JOB#:  [id number] / [id number]

DD:  01/03/2020 09:09  DT:  01/03/2020 09:25

## 2020-01-03 NOTE — PN
DATE:  01/01/2020



PSYCHIATRIC PROGRESS NOTE



This late entry 01/01/2020 covers elements not covered in my initial note.



SUBJECTIVE:  I met with the patient evening of 01/01/2020.  Per DERIAN Gannon, the

patient slept 4-1/2 hours previous night.  He is extremely hard of hearing, also

confused, but the hard of hearing makes his confusion appear worse than it is. 

Takes his meds in [place] [facility].



REVIEW OF SYSTEMS:  No CV, , pulmonary, eye system symptoms on review.  Hard

of hearing.  Reliability poor.



MENTAL STATUS EXAM:  Oriented to himself.  Insight, judgment, recent and remote

memory, attention, concentration, fund of knowledge poor, consistent with his

diagnosis mentioned in my initial note.



PLAN:  No change from initial note.





______________________________

MAN DELVIS LAUREANO MD



DR:  HARRY/ashly  JOB#:  [id number] / [id number]

DD:  01/03/2020 09:08  DT:  01/03/2020 09:21

## 2020-01-04 VITALS — DIASTOLIC BLOOD PRESSURE: 72 MMHG | SYSTOLIC BLOOD PRESSURE: 174 MMHG

## 2020-01-04 RX ADMIN — DONEPEZIL HYDROCHLORIDE SCH MG: 23 TABLET, FILM COATED ORAL at 21:01

## 2020-01-04 RX ADMIN — MELATONIN TAB 3 MG SCH MG: 3 TAB at 20:57

## 2020-01-04 RX ADMIN — ACETAMINOPHEN SCH MG: 160 SOLUTION ORAL at 20:57

## 2020-01-04 RX ADMIN — MAGNESIUM HYDROXIDE SCH MG: 400 SUSPENSION ORAL at 21:00

## 2020-01-04 RX ADMIN — MIRTAZAPINE SCH MG: 7.5 TABLET, FILM COATED ORAL at 20:57

## 2020-01-04 RX ADMIN — MEMANTINE HYDROCHLORIDE SCH MG: 10 TABLET ORAL at 20:57

## 2020-01-04 RX ADMIN — ASPIRIN 81 MG SCH MG: 81 TABLET ORAL at 09:20

## 2020-01-04 RX ADMIN — TRAZODONE HYDROCHLORIDE PRN MG: 50 TABLET, FILM COATED ORAL at 20:57

## 2020-01-04 RX ADMIN — ACETAMINOPHEN SCH MG: 160 SOLUTION ORAL at 09:21

## 2020-01-04 RX ADMIN — MEMANTINE HYDROCHLORIDE SCH MG: 10 TABLET ORAL at 09:20

## 2020-01-04 RX ADMIN — POTASSIUM CHLORIDE SCH MEQ: 1500 TABLET, EXTENDED RELEASE ORAL at 09:20

## 2020-01-04 RX ADMIN — SERTRALINE HYDROCHLORIDE SCH MG: 50 TABLET ORAL at 09:22

## 2020-01-04 NOTE — PDOC
Exam


Note:


George Note:


Please also refer to the separate dictated note~for this date of service 

dictated separately.~Patient seen individually. Discussed the patient with 

Nursing staff reviewed the chart.~Reviewed interim history and current 

functioning. Reviewed vital signs,~Labs/ Radiology~and current medications noted

below. Continue current treatment with the changes noted in the dictated 

addendum note





Assessment:


Vital Signs/I&O:





                                   Vital Signs








  Date Time  Temp Pulse Resp B/P (MAP) Pulse Ox O2 Delivery O2 Flow Rate FiO2


 


1/4/20 09:21  67  174/72    


 


1/4/20 04:56 97.0  14  96   


 


1/3/20 04:49      Room Air  














                                    I & O   


 


 1/3/20 1/3/20 1/4/20





 15:00 23:00 07:00


 


Intake Total 600 ml 360 ml 


 


Balance 600 ml 360 ml 











Current Medications:


Meds:





Current Medications








 Medications


  (Trade)  Dose


 Ordered  Sig/Lamberto


 Route


 PRN Reason  Start Time


 Stop Time Status Last Admin


Dose Admin


 


 Sertraline HCl


  (Zoloft)  50 mg  DAILY


 PO


   1/4/20 09:00


    1/4/20 09:22





 


 Donepezil HCl


  (Aricept)  23 mg  QHS


 PO


   1/4/20 21:00


    1/4/20 21:01











I have reviewed the current psychotropics carefully including drug interactions.

 Risk benefit ratio favors no change other than as noted in my dictated progress

note.





Diagnosis:


Problems:  


(1) Dementia with behavioral disturbance


(2) Anxiety disorder


(3) Dementia, vascular, with delusions


(4) Dementia, vascular, with depression


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


(7) Impulse control disorder











NIKHIL LAUREANO MD                  Jan 4, 2020 21:22

## 2020-01-05 VITALS — DIASTOLIC BLOOD PRESSURE: 67 MMHG | SYSTOLIC BLOOD PRESSURE: 148 MMHG

## 2020-01-05 VITALS — SYSTOLIC BLOOD PRESSURE: 106 MMHG | DIASTOLIC BLOOD PRESSURE: 66 MMHG

## 2020-01-05 LAB
ALBUMIN SERPL-MCNC: 3.2 G/DL (ref 3.4–5)
ALBUMIN/GLOB SERPL: 0.8 {RATIO} (ref 1–1.7)
ALP SERPL-CCNC: 87 U/L (ref 46–116)
ALT SERPL-CCNC: 21 U/L (ref 16–63)
ANION GAP SERPL CALC-SCNC: 9 MMOL/L (ref 6–14)
AST SERPL-CCNC: 21 U/L (ref 15–37)
BILIRUB SERPL-MCNC: 0.6 MG/DL (ref 0.2–1)
BUN/CREAT SERPL: 26 (ref 6–20)
CA-I SERPL ISE-MCNC: 34 MG/DL (ref 8–26)
CALCIUM SERPL-MCNC: 8.3 MG/DL (ref 8.5–10.1)
CHLORIDE SERPL-SCNC: 107 MMOL/L (ref 98–107)
CO2 SERPL-SCNC: 27 MMOL/L (ref 21–32)
CREAT SERPL-MCNC: 1.3 MG/DL (ref 0.7–1.3)
CRP SERPL-MCNC: 50.5 MG/L (ref 0–3.3)
ERYTHROCYTE [DISTWIDTH] IN BLOOD BY AUTOMATED COUNT: 14.2 % (ref 11.5–14.5)
ERYTHROCYTE [SEDIMENTATION RATE] IN BLOOD: 45 MM/H (ref 0–15)
GFR SERPLBLD BASED ON 1.73 SQ M-ARVRAT: 53.1 ML/MIN
GLOBULIN SER-MCNC: 3.8 G/DL (ref 2.2–3.8)
GLUCOSE SERPL-MCNC: 117 MG/DL (ref 70–99)
HCT VFR BLD CALC: 42.3 % (ref 39–53)
HGB BLD-MCNC: 13.9 G/DL (ref 13–17.5)
MCH RBC QN AUTO: 30 PG (ref 25–35)
MCHC RBC AUTO-ENTMCNC: 33 G/DL (ref 31–37)
MCV RBC AUTO: 92 FL (ref 79–100)
PLATELET # BLD AUTO: 183 X10^3/UL (ref 140–400)
POTASSIUM SERPL-SCNC: 4.9 MMOL/L (ref 3.5–5.1)
PROT SERPL-MCNC: 7 G/DL (ref 6.4–8.2)
RBC # BLD AUTO: 4.62 X10^6/UL (ref 4.3–5.7)
SODIUM SERPL-SCNC: 143 MMOL/L (ref 136–145)
WBC # BLD AUTO: 11.6 X10^3/UL (ref 4–11)

## 2020-01-05 RX ADMIN — ASPIRIN 81 MG SCH MG: 81 TABLET ORAL at 09:14

## 2020-01-05 RX ADMIN — SERTRALINE HYDROCHLORIDE SCH MG: 50 TABLET ORAL at 09:15

## 2020-01-05 RX ADMIN — CHOLECALCIFEROL CAP 1.25 MG (50000 UNIT) SCH UNIT: 1.25 CAP at 09:14

## 2020-01-05 RX ADMIN — POTASSIUM CHLORIDE SCH MEQ: 1500 TABLET, EXTENDED RELEASE ORAL at 09:14

## 2020-01-05 RX ADMIN — MEMANTINE HYDROCHLORIDE SCH MG: 10 TABLET ORAL at 20:12

## 2020-01-05 RX ADMIN — DONEPEZIL HYDROCHLORIDE SCH MG: 23 TABLET, FILM COATED ORAL at 20:12

## 2020-01-05 RX ADMIN — MEMANTINE HYDROCHLORIDE SCH MG: 10 TABLET ORAL at 09:14

## 2020-01-05 RX ADMIN — TRAZODONE HYDROCHLORIDE PRN MG: 50 TABLET, FILM COATED ORAL at 20:12

## 2020-01-05 RX ADMIN — MAGNESIUM HYDROXIDE SCH MG: 400 SUSPENSION ORAL at 20:17

## 2020-01-05 RX ADMIN — ACETAMINOPHEN SCH MG: 160 SOLUTION ORAL at 09:14

## 2020-01-05 RX ADMIN — ACETAMINOPHEN SCH MG: 160 SOLUTION ORAL at 20:17

## 2020-01-05 RX ADMIN — MELATONIN TAB 3 MG SCH MG: 3 TAB at 20:12

## 2020-01-05 RX ADMIN — MIRTAZAPINE SCH MG: 7.5 TABLET, FILM COATED ORAL at 20:12

## 2020-01-05 NOTE — PDOC
Exam


Note:


George Note:


Please also refer to the separate dictated note~for this date of service 

dictated separately.~Patient seen individually. Discussed the patient with 

Nursing staff reviewed the chart.~Reviewed interim history and current 

functioning. Reviewed vital signs,~Labs/ Radiology~and current medications noted

below. Continue current treatment with the changes noted in the dictated 

addendum note





Assessment:


Vital Signs/I&O:





                                   Vital Signs








  Date Time  Temp Pulse Resp B/P (MAP) Pulse Ox O2 Delivery O2 Flow Rate FiO2


 


1/5/20 15:48 97.6 76 18 106/66 (79) 97   


 


1/3/20 04:49      Room Air  














                                    I & O   


 


 1/4/20 1/4/20 1/5/20





 15:00 23:00 07:00


 


Intake Total 240 ml 480 ml 


 


Balance 240 ml 480 ml 











Current Medications:


Meds:





Current Medications








 Medications


  (Trade)  Dose


 Ordered  Sig/Lamberto


 Route


 PRN Reason  Start Time


 Stop Time Status Last Admin


Dose Admin


 


 Donepezil HCl


  (Aricept)  23 mg  QHS


 PO


   1/4/20 21:00


    1/5/20 20:12











I have reviewed the current psychotropics carefully including drug interactions.

 Risk benefit ratio favors no change other than as noted in my dictated progress

note.





Diagnosis:


Problems:  


(1) Dementia with behavioral disturbance


(2) Anxiety disorder


(3) Dementia, vascular, with delusions


(4) Dementia, vascular, with depression


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


(7) Impulse control disorder











NIKHIL LAUREANO MD                  Jan 5, 2020 20:44

## 2020-01-06 VITALS — DIASTOLIC BLOOD PRESSURE: 81 MMHG | SYSTOLIC BLOOD PRESSURE: 136 MMHG

## 2020-01-06 RX ADMIN — MEMANTINE HYDROCHLORIDE SCH MG: 10 TABLET ORAL at 08:29

## 2020-01-06 RX ADMIN — MAGNESIUM HYDROXIDE SCH MG: 400 SUSPENSION ORAL at 19:40

## 2020-01-06 RX ADMIN — SERTRALINE HYDROCHLORIDE SCH MG: 50 TABLET ORAL at 08:29

## 2020-01-06 RX ADMIN — ACETAMINOPHEN SCH MG: 160 SOLUTION ORAL at 19:40

## 2020-01-06 RX ADMIN — POTASSIUM CHLORIDE SCH MEQ: 1500 TABLET, EXTENDED RELEASE ORAL at 08:29

## 2020-01-06 RX ADMIN — TRAZODONE HYDROCHLORIDE PRN MG: 50 TABLET, FILM COATED ORAL at 19:41

## 2020-01-06 RX ADMIN — MEMANTINE HYDROCHLORIDE SCH MG: 10 TABLET ORAL at 19:40

## 2020-01-06 RX ADMIN — ACETAMINOPHEN SCH MG: 160 SOLUTION ORAL at 08:29

## 2020-01-06 RX ADMIN — MELATONIN TAB 3 MG SCH MG: 3 TAB at 19:40

## 2020-01-06 RX ADMIN — MIRTAZAPINE SCH MG: 7.5 TABLET, FILM COATED ORAL at 19:40

## 2020-01-06 RX ADMIN — DONEPEZIL HYDROCHLORIDE SCH MG: 23 TABLET, FILM COATED ORAL at 19:40

## 2020-01-06 RX ADMIN — ASPIRIN 81 MG SCH MG: 81 TABLET ORAL at 08:29

## 2020-01-06 NOTE — PDOC
Exam


Note:


George Note:


Please also refer to the separate dictated note~for this date of service 

dictated separately.~Patient seen individually. Discussed the patient with 

Nursing staff reviewed the chart.~Reviewed interim history and current 

functioning. Reviewed vital signs,~Labs/ Radiology~and current medications noted

below. Continue current treatment with the changes noted in the dictated 

addendum note





Assessment:


Vital Signs/I&O:





                                   Vital Signs








  Date Time  Temp Pulse Resp B/P (MAP) Pulse Ox O2 Delivery O2 Flow Rate FiO2


 


1/6/20 15:52 98.2 79 19 136/81 (99) 98   


 


1/3/20 04:49      Room Air  














                                    I & O   


 


 1/5/20 1/5/20 1/6/20





 15:00 23:00 07:00


 


Intake Total 480 ml 220 ml 


 


Balance 480 ml 220 ml 








Labs:





                                Laboratory Tests








Test


 1/5/20


21:55


 


White Blood Count


 11.6 x10^3/uL


(4.0-11.0)  H


 


Red Blood Count


 4.62 x10^6/uL


(4.30-5.70)


 


Hemoglobin


 13.9 g/dL


(13.0-17.5)


 


Hematocrit


 42.3 %


(39.0-53.0)


 


Mean Corpuscular Volume


 92 fL ()





 


Mean Corpuscular Hemoglobin 30 pg (25-35)  


 


Mean Corpuscular Hemoglobin


Concent 33 g/dL


(31-37)


 


Red Cell Distribution Width


 14.2 %


(11.5-14.5)


 


Platelet Count


 183 x10^3/uL


(140-400)


 


Erythrocyte Sedimentation Rate 45 (0-15)  H


 


Sodium Level


 143 mmol/L


(136-145)


 


Potassium Level


 4.9 mmol/L


(3.5-5.1)


 


Chloride Level


 107 mmol/L


()


 


Carbon Dioxide Level


 27 mmol/L


(21-32)


 


Anion Gap 9 (6-14)  


 


Blood Urea Nitrogen


 34 mg/dL


(8-26)  H


 


Creatinine


 1.3 mg/dL


(0.7-1.3)


 


Estimated GFR


(Cockcroft-Gault) 53.1  





 


BUN/Creatinine Ratio 26 (6-20)  H


 


Glucose Level


 117 mg/dL


(70-99)  H


 


Calcium Level


 8.3 mg/dL


(8.5-10.1)  L


 


Total Bilirubin


 0.6 mg/dL


(0.2-1.0)


 


Aspartate Amino Transferase


(AST) 21 U/L (15-37)





 


Alanine Aminotransferase (ALT)


 21 U/L (16-63)





 


Alkaline Phosphatase


 87 U/L


()


 


C-Reactive Protein


 50.5 mg/L


(0-3.3)  H


 


Total Protein


 7.0 g/dL


(6.4-8.2)


 


Albumin


 3.2 g/dL


(3.4-5.0)  L


 


Albumin/Globulin Ratio


 0.8 (1.0-1.7)


L











Current Medications:


I have reviewed the current psychotropics carefully including drug interactions.

 Risk benefit ratio favors no change other than as noted in my dictated progress

note.





Diagnosis:


Problems:  


(1) Dementia with behavioral disturbance


(2) Anxiety disorder


(3) Dementia, vascular, with delusions


(4) Dementia, vascular, with depression


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


(7) Impulse control disorder











NIKHIL LAUREANO MD                  Jan 6, 2020 20:47

## 2020-01-06 NOTE — PN
DATE:  01/04/2020



PSYCHIATRIC PROGRESS NOTE



This late entry 01/04/2020 covers elements not covered in my initial note.



SUBJECTIVE:  I met with the patient evening of 01/04/2020.  The patient slept

7-3/4 hours previous night.  He gets agitated with showers, received Zyprexa,

this seemed to help.



REVIEW OF SYSTEMS:  No CV, , pulmonary, eye, ENT system symptoms on review. 

Reliability poor.



MENTAL STATUS EXAM:  Oriented to himself.  Insight, judgment, recent and remote

memory, attention, concentration, fund of knowledge poor, consistent with his

diagnosis mentioned in my initial note.



PLAN:  No change from initial note.





______________________________

MAN DELVIS LAUREANO MD



DR:  HARRY/ashly  JOB#:  [id number] / [id number]

DD:  01/05/2020 21:08  DT:  01/06/2020 00:41

## 2020-01-06 NOTE — PN
DATE:  01/03/2020



PSYCHIATRIC PROGRESS NOTE



This late entry of 01/03/2020 covers the elements not covered in my initial

note.



SUBJECTIVE:  I met with the patient in the evening of 01/03/2020 and staffed at

a treatment team meeting with the entire team earlier in the day with DERIAN Espinosa.  The patient slept 6 hours previous night.  Appetite 40%, resistive with

redirection, confused, but compliant.  He gets paranoid, agitated at times, very

hard of hearing, which worsens confusion.



REVIEW OF SYSTEMS:  No CV, , pulmonary, eye system symptoms on review. 

Reliability poor.



MENTAL STATUS EXAM:  Oriented to himself.  Insight, judgment, recent and remote

memory, attention, concentration, fund of knowledge poor, consistent with his

diagnosis mentioned in my initial note.



PLAN:  No change from initial note.  Start Zyprexa Zydis 2.5 mg q. 2 hours

p.r.n. psychosis, agitation, max 7.5 mg in 24 hours.  Continue rest of the

psychotropics unchanged.





______________________________

NIKHIL LAUREANO MD



DR:  HARRY/ashly  JOB#:  [id number] / [id number]

DD:  01/05/2020 21:07  DT:  01/06/2020 00:39

## 2020-01-07 VITALS — SYSTOLIC BLOOD PRESSURE: 90 MMHG | DIASTOLIC BLOOD PRESSURE: 72 MMHG

## 2020-01-07 VITALS — DIASTOLIC BLOOD PRESSURE: 50 MMHG | SYSTOLIC BLOOD PRESSURE: 130 MMHG

## 2020-01-07 VITALS — DIASTOLIC BLOOD PRESSURE: 70 MMHG | SYSTOLIC BLOOD PRESSURE: 132 MMHG

## 2020-01-07 LAB
ALBUMIN SERPL-MCNC: 3 G/DL (ref 3.4–5)
ALBUMIN/GLOB SERPL: 0.8 {RATIO} (ref 1–1.7)
ALP SERPL-CCNC: 93 U/L (ref 46–116)
ALT SERPL-CCNC: 20 U/L (ref 16–63)
ANION GAP SERPL CALC-SCNC: 6 MMOL/L (ref 6–14)
AST SERPL-CCNC: 21 U/L (ref 15–37)
BASOPHILS # BLD AUTO: 0 X10^3/UL (ref 0–0.2)
BASOPHILS NFR BLD: 0 % (ref 0–3)
BILIRUB SERPL-MCNC: 0.5 MG/DL (ref 0.2–1)
BUN/CREAT SERPL: 28 (ref 6–20)
CA-I SERPL ISE-MCNC: 37 MG/DL (ref 8–26)
CALCIUM SERPL-MCNC: 8.5 MG/DL (ref 8.5–10.1)
CHLORIDE SERPL-SCNC: 109 MMOL/L (ref 98–107)
CO2 SERPL-SCNC: 27 MMOL/L (ref 21–32)
CREAT SERPL-MCNC: 1.3 MG/DL (ref 0.7–1.3)
EOSINOPHIL NFR BLD: 0.3 X10^3/UL (ref 0–0.7)
EOSINOPHIL NFR BLD: 3 % (ref 0–3)
ERYTHROCYTE [DISTWIDTH] IN BLOOD BY AUTOMATED COUNT: 14.7 % (ref 11.5–14.5)
GFR SERPLBLD BASED ON 1.73 SQ M-ARVRAT: 53.1 ML/MIN
GLOBULIN SER-MCNC: 3.9 G/DL (ref 2.2–3.8)
GLUCOSE SERPL-MCNC: 111 MG/DL (ref 70–99)
HCT VFR BLD CALC: 42.3 % (ref 39–53)
HGB BLD-MCNC: 13.8 G/DL (ref 13–17.5)
LYMPHOCYTES # BLD: 2.1 X10^3/UL (ref 1–4.8)
LYMPHOCYTES NFR BLD AUTO: 20 % (ref 24–48)
MCH RBC QN AUTO: 30 PG (ref 25–35)
MCHC RBC AUTO-ENTMCNC: 33 G/DL (ref 31–37)
MCV RBC AUTO: 92 FL (ref 79–100)
MONO #: 0.9 X10^3/UL (ref 0–1.1)
MONOCYTES NFR BLD: 9 % (ref 0–9)
NEUT #: 6.9 X10^3UL (ref 1.8–7.7)
NEUTROPHILS NFR BLD AUTO: 67 % (ref 31–73)
PLATELET # BLD AUTO: 232 X10^3/UL (ref 140–400)
POTASSIUM SERPL-SCNC: 4.5 MMOL/L (ref 3.5–5.1)
PROT SERPL-MCNC: 6.9 G/DL (ref 6.4–8.2)
RBC # BLD AUTO: 4.57 X10^6/UL (ref 4.3–5.7)
SODIUM SERPL-SCNC: 142 MMOL/L (ref 136–145)
WBC # BLD AUTO: 10.3 X10^3/UL (ref 4–11)

## 2020-01-07 RX ADMIN — MELATONIN TAB 3 MG SCH MG: 3 TAB at 19:53

## 2020-01-07 RX ADMIN — MIRTAZAPINE SCH MG: 7.5 TABLET, FILM COATED ORAL at 19:52

## 2020-01-07 RX ADMIN — ACETAMINOPHEN SCH MG: 160 SOLUTION ORAL at 08:18

## 2020-01-07 RX ADMIN — SERTRALINE HYDROCHLORIDE SCH MG: 50 TABLET ORAL at 08:27

## 2020-01-07 RX ADMIN — DIVALPROEX SODIUM SCH MG: 125 CAPSULE, COATED PELLETS ORAL at 17:14

## 2020-01-07 RX ADMIN — ACETAMINOPHEN SCH MG: 160 SOLUTION ORAL at 19:52

## 2020-01-07 RX ADMIN — POTASSIUM CHLORIDE SCH MEQ: 1500 TABLET, EXTENDED RELEASE ORAL at 08:26

## 2020-01-07 RX ADMIN — DONEPEZIL HYDROCHLORIDE SCH MG: 23 TABLET, FILM COATED ORAL at 19:52

## 2020-01-07 RX ADMIN — MEMANTINE HYDROCHLORIDE SCH MG: 10 TABLET ORAL at 19:53

## 2020-01-07 RX ADMIN — MEMANTINE HYDROCHLORIDE SCH MG: 10 TABLET ORAL at 08:26

## 2020-01-07 RX ADMIN — ASPIRIN 81 MG SCH MG: 81 TABLET ORAL at 08:19

## 2020-01-07 RX ADMIN — QUETIAPINE FUMARATE SCH MG: 25 TABLET, FILM COATED ORAL at 17:16

## 2020-01-07 RX ADMIN — DIVALPROEX SODIUM SCH MG: 125 CAPSULE, COATED PELLETS ORAL at 08:18

## 2020-01-07 RX ADMIN — MAGNESIUM HYDROXIDE SCH MG: 400 SUSPENSION ORAL at 19:52

## 2020-01-07 NOTE — PN
DATE:  01/05/2020



PSYCHIATRIC PROGRESS NOTE



This late entry, 01/05/2020, covers elements not covered in my initial note.



SUBJECTIVE:  I met with the patient evening of 01/05/2020.  The patient slept 7

hours previous night.  He is extremely hard of hearing, some impairment of

ambulation.  No CV, , pulmonary, eye system symptoms on review.  He remains

confused.  He did well the previous night.  Per nursing report, takes

medications whole, but during the day on 01/05/2020, he was combative and

suddenly jerked the right arm of a nursing staff.



REVIEW OF SYSTEMS:  No CV, , pulmonary, eye system symptoms on review.  Hard

of hearing.  Reliability poor.



MENTAL STATUS EXAM:  Oriented to himself.  Insight, judgment, recent and remote

memory, attention, concentration, fund of knowledge poor, consistent with his

diagnosis.



IMPRESSION:  Major neurocognitive disorder; Alzheimer, vascular with delusion;

depression; behavioral disturbance; anxiety disorder, unspecified; impulse

control disorder, unspecified; hard of hearing.



PLAN:  Continue current psychotropics, Remeron 7.5 mg at bedtime, melatonin 3 mg

at bedtime, Ativan p.r.n., Aricept ____ mg a day, Namenda 10 mg b.i.d., Zyprexa

p.r.n., trazodone p.r.n., Zoloft 50 mg a day.  May consider Depakote as a mood

stabilizer depending on how he does over the next day or so with his aggression.





______________________________

NIKHIL LAUREANO MD



DR:  HARRY/ashly  JOB#:  [id number] / [id number]

DD:  01/06/2020 13:39  DT:  01/06/2020 22:28

## 2020-01-08 VITALS — DIASTOLIC BLOOD PRESSURE: 57 MMHG | SYSTOLIC BLOOD PRESSURE: 122 MMHG

## 2020-01-08 VITALS — DIASTOLIC BLOOD PRESSURE: 89 MMHG | SYSTOLIC BLOOD PRESSURE: 162 MMHG

## 2020-01-08 VITALS — DIASTOLIC BLOOD PRESSURE: 64 MMHG | SYSTOLIC BLOOD PRESSURE: 120 MMHG

## 2020-01-08 LAB
APTT PPP: YELLOW S
BACTERIA #/AREA URNS HPF: (no result) /HPF
BILIRUB UR QL STRIP: (no result)
FIBRINOGEN PPP-MCNC: CLEAR MG/DL
GLUCOSE UR STRIP-MCNC: (no result) MG/DL
NITRITE UR QL STRIP: (no result)
RBC #/AREA URNS HPF: (no result) /HPF (ref 0–2)
SP GR UR STRIP: 1.02
SQUAMOUS #/AREA URNS LPF: (no result) /LPF
UROBILINOGEN UR-MCNC: 0.2 MG/DL
WBC #/AREA URNS HPF: (no result) /HPF (ref 0–4)

## 2020-01-08 RX ADMIN — SERTRALINE HYDROCHLORIDE SCH MG: 50 TABLET ORAL at 08:19

## 2020-01-08 RX ADMIN — MEMANTINE HYDROCHLORIDE SCH MG: 10 TABLET ORAL at 19:55

## 2020-01-08 RX ADMIN — ACETAMINOPHEN SCH MG: 160 SOLUTION ORAL at 19:55

## 2020-01-08 RX ADMIN — QUETIAPINE FUMARATE SCH MG: 25 TABLET, FILM COATED ORAL at 12:11

## 2020-01-08 RX ADMIN — MIRTAZAPINE SCH MG: 7.5 TABLET, FILM COATED ORAL at 19:55

## 2020-01-08 RX ADMIN — DONEPEZIL HYDROCHLORIDE SCH MG: 23 TABLET, FILM COATED ORAL at 19:55

## 2020-01-08 RX ADMIN — QUETIAPINE FUMARATE SCH MG: 25 TABLET, FILM COATED ORAL at 17:33

## 2020-01-08 RX ADMIN — ACETAMINOPHEN SCH MG: 160 SOLUTION ORAL at 08:18

## 2020-01-08 RX ADMIN — MEMANTINE HYDROCHLORIDE SCH MG: 10 TABLET ORAL at 08:19

## 2020-01-08 RX ADMIN — NAPROXEN SCH MG: 250 TABLET ORAL at 19:55

## 2020-01-08 RX ADMIN — MAGNESIUM HYDROXIDE SCH MG: 400 SUSPENSION ORAL at 19:55

## 2020-01-08 RX ADMIN — DIVALPROEX SODIUM SCH MG: 125 CAPSULE, COATED PELLETS ORAL at 08:18

## 2020-01-08 RX ADMIN — DIVALPROEX SODIUM SCH MG: 125 CAPSULE, COATED PELLETS ORAL at 17:33

## 2020-01-08 RX ADMIN — QUETIAPINE FUMARATE SCH MG: 25 TABLET, FILM COATED ORAL at 08:19

## 2020-01-08 RX ADMIN — POTASSIUM CHLORIDE SCH MEQ: 1500 TABLET, EXTENDED RELEASE ORAL at 08:19

## 2020-01-08 RX ADMIN — ASPIRIN 81 MG SCH MG: 81 TABLET ORAL at 08:19

## 2020-01-08 RX ADMIN — MELATONIN TAB 3 MG SCH MG: 3 TAB at 19:55

## 2020-01-08 NOTE — PDOC
Exam


Note:


George Note:


Please also refer to the separate dictated note~for this date of service 

dictated separately.~Patient seen individually. Discussed the patient with 

Nursing staff reviewed the chart.~Reviewed interim history and current 

functioning. Reviewed vital signs,~Labs/ Radiology~and current medications noted

below. Continue current treatment with the changes noted in the dictated 

addendum note





Assessment:


Vital Signs/I&O:





                                   Vital Signs








  Date Time  Temp Pulse Resp B/P (MAP) Pulse Ox O2 Delivery O2 Flow Rate FiO2


 


1/8/20 15:48 97.4 72 21 162/89 (113) 93 Room Air  














                                    I & O   


 


 1/7/20 1/7/20 1/8/20





 15:00 23:00 07:00


 


Intake Total 600 ml 360 ml 240 ml


 


Balance 600 ml 360 ml 240 ml








Labs:





                                Laboratory Tests








Test


 1/8/20


07:50


 


Urine Collection Type Unknown  


 


Urine Color Yellow  


 


Urine Clarity Clear  


 


Urine pH 7.0  


 


Urine Specific Gravity 1.025  


 


Urine Protein


 Neg


(NEG-TRACE)


 


Urine Glucose (UA)


 Neg mg/dL


(NEG)


 


Urine Ketones (Stick)


 15 mg/dL (NEG)





 


Urine Blood Neg (NEG)  


 


Urine Nitrite Neg (NEG)  


 


Urine Bilirubin Neg (NEG)  


 


Urine Urobilinogen Dipstick


 0.2 mg/dL (0.2


mg/dL)


 


Urine Leukocyte Esterase Neg (NEG)  


 


Urine RBC


 1-2 /HPF (0-2)





 


Urine WBC


 5-10 /HPF


(0-4)


 


Urine Squamous Epithelial


Cells Few /LPF  





 


Urine Bacteria


 Mod /HPF


(0-FEW)


 


Urine Mucus Mod /LPF  











Current Medications:


Meds:





Current Medications








 Medications


  (Trade)  Dose


 Ordered  Sig/Lamberto


 Route


 PRN Reason  Start Time


 Stop Time Status Last Admin


Dose Admin


 


 Naproxen


  (Naprosyn)  250 mg  BID


 PO


   1/8/20 21:00


    1/8/20 19:55











I have reviewed the current psychotropics carefully including drug interactions.

 Risk benefit ratio favors no change other than as noted in my dictated progress

note.





Diagnosis:


Problems:  


(1) Dementia with behavioral disturbance


(2) Anxiety disorder


(3) Dementia, vascular, with delusions


(4) Dementia, vascular, with depression


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


(7) Impulse control disorder











NIKHIL LAUREANO MD                  Jan 8, 2020 20:57

## 2020-01-08 NOTE — RAD
3 view study right elbow

 

Clinical indications: Bruising.

 

FINDINGS: No joint effusion is seen. No acute fracture or dislocation or 

lytic process is seen. Prominent traction spur of the olecranon is seen at

the attachment of the triceps tendon. No significant soft tissue 

thickening of the olecranon bursa is evident.

 

IMPRESSION: No acute osseous abnormality.

 

Electronically signed by: Ilan Chen MD (1/8/2020 9:51 AM) [id number]

## 2020-01-08 NOTE — PN
DATE:  01/06/2020



This late entry 01/06 covers elements not covered in my initial note.



SUBJECTIVE:  I met with the patient in the evening of 01/06.  The patient slept

6-3/4 hours previous night per DERIAN Espinosa.  He did well in the morning, but by

the afternoon, he was confused, disrobing in the day room, extremely hard of

hearing, which makes his confusion worse.  He received Zyprexa p.r.n.  Door

checking previous night.  He has some cellulitis, with increased white cell

count, sed rate and CRP.  Will defer to Dr. Schreiber.



REVIEW OF SYSTEMS:  No CV, , pulmonary, eye, ENT system symptoms on review. 

Reliability poor.



MENTAL STATUS EXAMINATION:  Oriented to himself.  Insight, judgment, recent and

remote memory, attention, concentration, fund of knowledge poor, consistent with

his diagnosis mentioned in my initial note.

 

PLAN:  No change from initial note.  Treat the cellulitis.  We will go ahead and

add Depakote Sprinkles 125 mg 9 a.m., 5:00 p.m.  Check CBC, CMP, valproic acid

level in 3 days.  Maintain Remeron 7.5 at bedtime, melatonin 3 mg at bedtime,

Aricept 22.5 mg at bedtime, Namenda 10 mg b.i.d., Zyprexa p.r.n., trazodone

p.r.n., Zoloft 50 mg a day.





______________________________

NIKHIL LAUREAON MD



DR:  HARRY/ashly  JOB#:  [id number] / [id number]

DD:  01/07/2020 16:16  DT:  01/07/2020 22:59

## 2020-01-09 VITALS — DIASTOLIC BLOOD PRESSURE: 57 MMHG | SYSTOLIC BLOOD PRESSURE: 109 MMHG

## 2020-01-09 VITALS — SYSTOLIC BLOOD PRESSURE: 127 MMHG | DIASTOLIC BLOOD PRESSURE: 62 MMHG

## 2020-01-09 RX ADMIN — NAPROXEN SCH MG: 250 TABLET ORAL at 10:42

## 2020-01-09 RX ADMIN — MEMANTINE HYDROCHLORIDE SCH MG: 10 TABLET ORAL at 10:43

## 2020-01-09 RX ADMIN — QUETIAPINE FUMARATE SCH MG: 25 TABLET, FILM COATED ORAL at 13:35

## 2020-01-09 RX ADMIN — DIVALPROEX SODIUM SCH MG: 125 CAPSULE, COATED PELLETS ORAL at 10:43

## 2020-01-09 RX ADMIN — DIVALPROEX SODIUM SCH MG: 125 CAPSULE, COATED PELLETS ORAL at 17:01

## 2020-01-09 RX ADMIN — ACETAMINOPHEN SCH MG: 160 SOLUTION ORAL at 10:41

## 2020-01-09 RX ADMIN — MEMANTINE HYDROCHLORIDE SCH MG: 10 TABLET ORAL at 19:36

## 2020-01-09 RX ADMIN — MIRTAZAPINE SCH MG: 7.5 TABLET, FILM COATED ORAL at 19:36

## 2020-01-09 RX ADMIN — MELATONIN TAB 3 MG SCH MG: 3 TAB at 19:36

## 2020-01-09 RX ADMIN — TRAZODONE HYDROCHLORIDE PRN MG: 50 TABLET, FILM COATED ORAL at 19:36

## 2020-01-09 RX ADMIN — SERTRALINE HYDROCHLORIDE SCH MG: 50 TABLET ORAL at 10:43

## 2020-01-09 RX ADMIN — DONEPEZIL HYDROCHLORIDE SCH MG: 23 TABLET, FILM COATED ORAL at 19:36

## 2020-01-09 RX ADMIN — ASPIRIN 81 MG SCH MG: 81 TABLET ORAL at 10:41

## 2020-01-09 RX ADMIN — POTASSIUM CHLORIDE SCH MEQ: 1500 TABLET, EXTENDED RELEASE ORAL at 10:43

## 2020-01-09 RX ADMIN — QUETIAPINE FUMARATE SCH MG: 25 TABLET, FILM COATED ORAL at 10:43

## 2020-01-09 RX ADMIN — NAPROXEN SCH MG: 250 TABLET ORAL at 19:36

## 2020-01-09 RX ADMIN — MAGNESIUM HYDROXIDE SCH MG: 400 SUSPENSION ORAL at 19:36

## 2020-01-09 RX ADMIN — QUETIAPINE FUMARATE SCH MG: 25 TABLET, FILM COATED ORAL at 17:01

## 2020-01-09 RX ADMIN — ACETAMINOPHEN SCH MG: 160 SOLUTION ORAL at 19:36

## 2020-01-09 NOTE — PN
DATE:  01/07/2020



PSYCHIATRIC PROGRESS NOTE



This late entry 01/07/2020 covers elements not covered in my initial note.



SUBJECTIVE:  I met with the patient evening of 01/07/2020.  Per DERIAN Gutiérrez, the

patient slept 9 hours previous night.  He has had a very difficult day all day,

combative, resistive to cares, drowsy at times.  Blood pressure was somewhat

low, seems to have a cellulitis on right elbow and Dr. Schreiber is following this. 

UA has been checked.  Chemistry profile awaited.



REVIEW OF SYSTEMS:  No CV, , pulmonary, eye, ENT system symptoms on review. 

Reliability poor.



MENTAL STATUS EXAM:  Oriented to himself.  Insight, judgment, recent and remote

memory, attention, concentration, fund of knowledge poor, consistent with his

diagnosis.  He is restless, anxious, paranoid, labile.



LABORATORY DATA:  Reviewed.



IMPRESSION:  Major neurocognitive disorder, Alzheimer, vascular with delusion,

depression, behavioral disturbance; anxiety disorder, unspecified; impulse

control disorder, unspecified.



PLAN:  Check UA.  Check chemistry profile.  Start Seroquel 12.5 mg 9 a.m., 1:00

p.m., 5:00 p.m. as a mood stabilizer and for psychotic symptoms, agitation and

aggression.  Rest unchanged per initial note.





______________________________

MAN DELVIS LAUREANO MD



DR:  HARRY/ashly  JOB#:  [id number] / [id number]

DD:  01/08/2020 17:23  DT:  01/09/2020 03:07

## 2020-01-09 NOTE — TX PLAN
Interdisciplinary Tx Plan


Admission Information


Dec 30, 2019 at 17:58


Legal Status (on Admission):  Voluntary


DPOA/Guardian Name:  Srikanth Dietrich


Contact Phone Number:  [phone]


Other Contact Name:  [facility])


Other Contact Phone:  [phone]


Verified Code Status:  DNR


Allergies:  


Coded Allergies:  


     No Known Drug Allergies (Unverified , 12/30/19)





Diagnoses


Reasons for Admission:  Aggressive, Agitated, Combative, Poor impulse control


Problem in Patient's Words:  


Part of the dx natural progression


Additional Admission Comments:  


According to the intake, pt is wandering,  


resistive to medications, exit seeking, combative  


to staff, aggressive at times with redirection,  


hitting staff, pushing them in the wall and  


throwing people's belongings.





Problems


Active Problems:  


Non-compliant with meds


Wanders


physical aggression/combative


Inactive Problems:  


n/a





Pt Strengths/Limitations


Ability for [place]:  Poor


Cognitive Functioning/Ability:  Poor


Communication Skills/Ability:  Poor


Financial Resources:  Fair


Insight/Judgement:  Poor


Intellectual Ability:  Poor


Physical Health:  Poor


Social Skills:  Poor


Stability in Family:  Good


Stability in School/Work:  Poor


Verbal Skills:  Poor





Discharge Criteria


Discharge Criteria:  Adequate arrangements @DC, Improved behavior, Improved moo

d/thought





Preliminary Discharge Plan


Preliminary DC Plan:  Current Living Arrange.





Special Precautions


Fall Risk:  Low





Initial D/C Plan





Pt will plan to discharge back to [facility] ([facility]


Identified Discharge Needs:  


Pt needs a psychiatrist to follow up with  


psychiatric medications.





Currently Utilized Resources


Currently Utilized Resources/P:  


Has a primary care through the facility


Referrals Community Resources:  


potential for psychiatrist at the time of discharge.





Identified Problems/Hx/Goals


Objectives/Short-Term Goals


Short Term Goals:  Dec. Aggression, Dec. Outbursts, Improved Social Skills, 

Medication Stabilization


Short Term Goals in Patient's:  


Medication management


Behavioral management





Interventions/Frequency


Staff Interventions/Frequency&:  


Psychiatrist to see pt at least 3x per week.


Social work to see pt at least 2x per week.


Nursing to complete 15 minute checks daily.


Encourage group activities.





History


Vocational History:  


Owned a full-service station


Education:  


Graduated High School





Treatment Plan Explained


Patient/Representative had this treatment plan explained to him/her as indicated

by the signature below and


has been given the opportunity to ask questions and make suggestions:











Date:  





Patient/Representative Signature: _____________________________________________


Patient/Representative Decline:  Yes (understands the plan; asks to just be 

updated )











NANDO PECK                   Jan 2, 2020 18:58
Interdisciplinary Tx Plan


Admission Information


Dec 30, 2019 at 17:58


Legal Status (on Admission):  Voluntary


DPOA/Guardian Name:  Srikanth Dietrich


Contact Phone Number:  [phone]


Other Contact Name:  [facility])


Other Contact Phone:  [phone]


Verified Code Status:  DNR


Allergies:  


Coded Allergies:  


     No Known Drug Allergies (Unverified , 12/30/19)





Diagnoses


Reasons for Admission:  Aggressive, Agitated, Combative, Poor impulse control


Problem in Patient's Words:  


Part of the dx natural progression


Additional Admission Comments:  


According to the intake, pt is wandering,  


resistive to medications, exit seeking, combative  


to staff, aggressive at times with redirection,  


hitting staff, pushing them in the wall and  


throwing people's belongings.





Problems


Active Problems:  


Non-compliant with meds


Wanders


physical aggression/combative


Inactive Problems:  


n/a





Pt Strengths/Limitations


Ability for [place]:  Poor


Cognitive Functioning/Ability:  Poor


Communication Skills/Ability:  Poor


Financial Resources:  Fair


Insight/Judgement:  Poor


Intellectual Ability:  Poor


Physical Health:  Poor


Social Skills:  Poor


Stability in Family:  Good


Stability in School/Work:  Poor


Verbal Skills:  Poor





Discharge Criteria


Discharge Criteria:  Adequate arrangements @DC, Improved behavior, Improved moo

d/thought





Preliminary Discharge Plan


Preliminary DC Plan:  Current Living Arrange.





Special Precautions


Fall Risk:  Low





Initial D/C Plan





Pt will plan to discharge back to [facility]


Identified Discharge Needs:  


Pt needs a psychiatrist to follow up with  


psychiatric medications.





Currently Utilized Resources


Currently Utilized Resources/P:  


Has a primary care through the facility


Referrals Community Resources:  


potential for psychiatrist at the time of discharge.





Identified Problems/Hx/Goals


Objectives/Short-Term Goals


Short Term Goals:  Dec. Aggression, Dec. Outbursts, Improved Social Skills, 

Medication Stabilization


Short Term Goals in Patient's:  


Medication management


Behavioral management





Interventions/Frequency


Staff Interventions/Frequency&:  


Psychiatrist to see pt at least 3x per week.


Social work to see pt at least 2x per week.


Nursing to complete 15 minute checks daily.


Encourage group activities.





History


Vocational History:  


Owned a full-service station


Education:  


Graduated High School





Treatment Plan Explained


Patient/Representative had this treatment plan explained to him/her as indicated

by the signature below and


has been given the opportunity to ask questions and make suggestions:











Date:  





Patient/Representative Signature: _____________________________________________





Status Update


Update


Pt is eating up to 75% of meals and sleeping 7 hours on average.  Pt will take 

medications in chocolate or vanilla boost and is mostly compliant.  Pt is found 

sleeping a lot during the day in random places (i.e. chairs in the hallway).  Pt

has little to no group participation; may be more beneficial to complete 1:1 

activities.  With pt current behavior, he may not be able to discharge back to 

[facility], as his aggression and behaviors have yet to decrease.  Pt is on 

Depakote 125mg q BID and Seroquel 12.5 mg TID.  Labs are to be drawn Friday 

morning.  SW will continue to follow up with pt son and the facility with 

updates.  ELOS is for 10 days.











NANDO PECK                   Jan 9, 2020 16:05
Detail Level: Detailed

## 2020-01-09 NOTE — PDOC
Exam


Note:


George Note:


Please also refer to the separate dictated note~for this date of service 

dictated separately.~Patient seen individually. Discussed the patient with 

Nursing staff reviewed the chart.~Reviewed interim history and current 

functioning. Reviewed vital signs,~Labs/ Radiology~and current medications noted

below. Continue current treatment with the changes noted in the dictated 

addendum note





Assessment:


Vital Signs/I&O:





                                   Vital Signs








  Date Time  Temp Pulse Resp B/P (MAP) Pulse Ox O2 Delivery O2 Flow Rate FiO2


 


1/9/20 15:29 97.7 68 16 127/62 (83) 95 Room Air  














                                    I & O   


 


 1/8/20 1/8/20 1/9/20





 15:00 23:00 07:00


 


Intake Total 360 ml 240 ml 


 


Balance 360 ml 240 ml 











Current Medications:


I have reviewed the current psychotropics carefully including drug interactions.

 Risk benefit ratio favors no change other than as noted in my dictated progress

note.





Diagnosis:


Problems:  


(1) Dementia with behavioral disturbance


(2) Anxiety disorder


(3) Dementia, vascular, with delusions


(4) Dementia, vascular, with depression


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


(7) Impulse control disorder











NIKHIL LAUREANO MD                  Jan 9, 2020 21:04

## 2020-01-10 VITALS — DIASTOLIC BLOOD PRESSURE: 62 MMHG | SYSTOLIC BLOOD PRESSURE: 133 MMHG

## 2020-01-10 VITALS — SYSTOLIC BLOOD PRESSURE: 159 MMHG | DIASTOLIC BLOOD PRESSURE: 63 MMHG

## 2020-01-10 LAB — VAL ACID: 12 MCG/ML (ref 50–100)

## 2020-01-10 RX ADMIN — NAPROXEN SCH MG: 250 TABLET ORAL at 09:40

## 2020-01-10 RX ADMIN — MAGNESIUM HYDROXIDE SCH MG: 400 SUSPENSION ORAL at 20:47

## 2020-01-10 RX ADMIN — QUETIAPINE FUMARATE SCH MG: 25 TABLET, FILM COATED ORAL at 18:10

## 2020-01-10 RX ADMIN — DONEPEZIL HYDROCHLORIDE SCH MG: 23 TABLET, FILM COATED ORAL at 20:47

## 2020-01-10 RX ADMIN — SERTRALINE HYDROCHLORIDE SCH MG: 50 TABLET ORAL at 09:41

## 2020-01-10 RX ADMIN — NAPROXEN SCH MG: 250 TABLET ORAL at 20:47

## 2020-01-10 RX ADMIN — DIVALPROEX SODIUM SCH MG: 125 CAPSULE, COATED PELLETS ORAL at 18:10

## 2020-01-10 RX ADMIN — DIVALPROEX SODIUM SCH MG: 125 CAPSULE, COATED PELLETS ORAL at 09:38

## 2020-01-10 RX ADMIN — MEMANTINE HYDROCHLORIDE SCH MG: 10 TABLET ORAL at 20:47

## 2020-01-10 RX ADMIN — MIRTAZAPINE SCH MG: 7.5 TABLET, FILM COATED ORAL at 20:47

## 2020-01-10 RX ADMIN — CARBIDOPA AND LEVODOPA SCH TAB: 25; 100 TABLET ORAL at 20:52

## 2020-01-10 RX ADMIN — ASPIRIN 81 MG SCH MG: 81 TABLET ORAL at 09:38

## 2020-01-10 RX ADMIN — ACETAMINOPHEN SCH MG: 160 SOLUTION ORAL at 20:47

## 2020-01-10 RX ADMIN — MEMANTINE HYDROCHLORIDE SCH MG: 10 TABLET ORAL at 09:41

## 2020-01-10 RX ADMIN — POTASSIUM CHLORIDE SCH MEQ: 1500 TABLET, EXTENDED RELEASE ORAL at 09:41

## 2020-01-10 RX ADMIN — QUETIAPINE FUMARATE SCH MG: 25 TABLET, FILM COATED ORAL at 09:37

## 2020-01-10 RX ADMIN — QUETIAPINE FUMARATE SCH MG: 25 TABLET, FILM COATED ORAL at 13:14

## 2020-01-10 RX ADMIN — ACETAMINOPHEN SCH MG: 160 SOLUTION ORAL at 09:36

## 2020-01-10 RX ADMIN — MELATONIN TAB 3 MG SCH MG: 3 TAB at 20:47

## 2020-01-10 NOTE — PN
DATE:  01/08/2020



PSYCHIATRIC PROGRESS NOTE



This late entry 01/08/2020 covers elements not covered in my initial note.



SUBJECTIVE:  I met with the patient evening of 01/08/2020.  Per DERIAN Gutiérrez,

patient slept 7 hours previous night.  He was sleeping early morning, was in the

quiet room, then was agitated, resistive to dressing and toileting.  We will

check a UA for culture and sensitivity.  X-ray of right elbow shows bursitis. 

We will defer to Dr. Schreiber.  Fluids are being pushed.



REVIEW OF SYSTEMS:  No CV, , pulmonary, eye, ENT system symptoms on review. 

He is very hard of hearing.  Reliability poor.



MENTAL STATUS EXAM:  Oriented to himself.  Insight, judgment, recent and remote

memory, attention, concentration, fund of knowledge poor, consistent with his

diagnosis mentioned in my initial note.



PLAN:  No change from initial note.





______________________________

MAN DELVIS LAUREANO MD



DR:  HARRY/ashly  JOB#:  [id number] / [id number]

DD:  01/10/2020 09:45  DT:  01/10/2020 20:47

## 2020-01-11 VITALS — DIASTOLIC BLOOD PRESSURE: 68 MMHG | SYSTOLIC BLOOD PRESSURE: 111 MMHG

## 2020-01-11 VITALS — DIASTOLIC BLOOD PRESSURE: 65 MMHG | SYSTOLIC BLOOD PRESSURE: 100 MMHG

## 2020-01-11 RX ADMIN — SERTRALINE HYDROCHLORIDE SCH MG: 50 TABLET ORAL at 08:48

## 2020-01-11 RX ADMIN — MIRTAZAPINE SCH MG: 7.5 TABLET, FILM COATED ORAL at 19:55

## 2020-01-11 RX ADMIN — CARBIDOPA AND LEVODOPA SCH TAB: 25; 100 TABLET ORAL at 08:48

## 2020-01-11 RX ADMIN — QUETIAPINE FUMARATE SCH MG: 25 TABLET, FILM COATED ORAL at 17:23

## 2020-01-11 RX ADMIN — ACETAMINOPHEN SCH MG: 160 SOLUTION ORAL at 08:46

## 2020-01-11 RX ADMIN — CARBIDOPA AND LEVODOPA SCH TAB: 25; 100 TABLET ORAL at 19:55

## 2020-01-11 RX ADMIN — ACETAMINOPHEN SCH MG: 160 SOLUTION ORAL at 20:19

## 2020-01-11 RX ADMIN — ASPIRIN 81 MG SCH MG: 81 TABLET ORAL at 08:47

## 2020-01-11 RX ADMIN — DONEPEZIL HYDROCHLORIDE SCH MG: 23 TABLET, FILM COATED ORAL at 19:55

## 2020-01-11 RX ADMIN — MEMANTINE HYDROCHLORIDE SCH MG: 10 TABLET ORAL at 19:55

## 2020-01-11 RX ADMIN — QUETIAPINE FUMARATE SCH MG: 25 TABLET, FILM COATED ORAL at 08:48

## 2020-01-11 RX ADMIN — MAGNESIUM HYDROXIDE SCH MG: 400 SUSPENSION ORAL at 20:19

## 2020-01-11 RX ADMIN — CHOLECALCIFEROL CAP 1.25 MG (50000 UNIT) SCH UNIT: 1.25 CAP at 08:50

## 2020-01-11 RX ADMIN — NAPROXEN SCH MG: 250 TABLET ORAL at 19:56

## 2020-01-11 RX ADMIN — QUETIAPINE FUMARATE SCH MG: 25 TABLET, FILM COATED ORAL at 13:28

## 2020-01-11 RX ADMIN — DIVALPROEX SODIUM SCH MG: 125 CAPSULE, COATED PELLETS ORAL at 17:23

## 2020-01-11 RX ADMIN — TRAZODONE HYDROCHLORIDE PRN MG: 50 TABLET, FILM COATED ORAL at 19:56

## 2020-01-11 RX ADMIN — CARBIDOPA AND LEVODOPA SCH TAB: 25; 100 TABLET ORAL at 13:28

## 2020-01-11 RX ADMIN — MEMANTINE HYDROCHLORIDE SCH MG: 10 TABLET ORAL at 08:50

## 2020-01-11 RX ADMIN — NAPROXEN SCH MG: 250 TABLET ORAL at 08:47

## 2020-01-11 RX ADMIN — NYSTATIN SCH APP: 100000 POWDER TOPICAL at 08:50

## 2020-01-11 RX ADMIN — DIVALPROEX SODIUM SCH MG: 125 CAPSULE, COATED PELLETS ORAL at 08:47

## 2020-01-11 RX ADMIN — NYSTATIN SCH APP: 100000 POWDER TOPICAL at 20:20

## 2020-01-11 RX ADMIN — MELATONIN TAB 3 MG SCH MG: 3 TAB at 19:55

## 2020-01-11 RX ADMIN — POTASSIUM CHLORIDE SCH MEQ: 1500 TABLET, EXTENDED RELEASE ORAL at 08:48

## 2020-01-11 NOTE — PN
DATE:  01/09/2020



This late entry 01/09 covers elements not covered in my initial note.



SUBJECTIVE:  I met with the patient evening of 01/09 and staffed at a treatment

team meeting with the entire team in the morning.  The patient's elbow shows

bursitis, will defer to Dr. Schreiber.  Slept 7 hours.  Appetite 75%.  Confused,

drowsy, hard of hearing, combative with family during visits.



REVIEW OF SYSTEMS:  No CV, , pulmonary, eye, ENT system symptoms on review. 

Reliability poor.



MENTAL STATUS EXAMINATION:  Oriented to himself.  Insight, judgment, recent and

remote memory, attention, concentration, fund of knowledge poor, consistent with

his diagnosis mentioned in my initial note.



PLAN:  No change from initial note.





______________________________

MAN DELVIS LAUREANO MD



DR:  HARRY/ashly  JOB#:  [id number] / [id number]

DD:  01/10/2020 12:25  DT:  01/10/2020 23:19

## 2020-01-11 NOTE — PDOC
Exam


Note:


George Note:


Please also refer to the separate dictated note~for this date of service 

dictated separately.~Patient seen individually. Discussed the patient with 

Nursing staff reviewed the chart.~Reviewed interim history and current 

functioning. Reviewed vital signs,~Labs/ Radiology~and current medications noted

below. Continue current treatment with the changes noted in the dictated 

addendum note





Assessment:


Vital Signs/I&O:





                                   Vital Signs








  Date Time  Temp Pulse Resp B/P (MAP) Pulse Ox O2 Delivery O2 Flow Rate FiO2


 


1/11/20 16:06 97.6 80 18 111/68 (82) 93   


 


1/9/20 15:29      Room Air  














                                    I & O   


 


 1/10/20 1/10/20 1/11/20





 15:00 23:00 07:00


 


Intake Total 840 ml 240 ml 


 


Balance 840 ml 240 ml 











Current Medications:


Meds:





Current Medications








 Medications


  (Trade)  Dose


 Ordered  Sig/Lamberto


 Route


 PRN Reason  Start Time


 Stop Time Status Last Admin


Dose Admin


 


 Nystatin


  (Nystop)  1 keenan  BID


 TP


   1/11/20 09:00


    1/11/20 20:20











I have reviewed the current psychotropics carefully including drug interactions.

 Risk benefit ratio favors no change other than as noted in my dictated progress

note.





Diagnosis:


Problems:  


(1) Dementia with behavioral disturbance


(2) Anxiety disorder


(3) Dementia, vascular, with delusions


(4) Dementia, vascular, with depression


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


(7) Impulse control disorder











NIKHIL LAUREANO MD                 Jan 11, 2020 21:42

## 2020-01-12 VITALS — DIASTOLIC BLOOD PRESSURE: 67 MMHG | SYSTOLIC BLOOD PRESSURE: 154 MMHG

## 2020-01-12 VITALS — DIASTOLIC BLOOD PRESSURE: 56 MMHG | SYSTOLIC BLOOD PRESSURE: 96 MMHG

## 2020-01-12 VITALS — DIASTOLIC BLOOD PRESSURE: 80 MMHG | SYSTOLIC BLOOD PRESSURE: 137 MMHG

## 2020-01-12 LAB
ALBUMIN SERPL-MCNC: 3 G/DL (ref 3.4–5)
ALBUMIN/GLOB SERPL: 0.8 {RATIO} (ref 1–1.7)
ALP SERPL-CCNC: 93 U/L (ref 46–116)
ALT SERPL-CCNC: 11 U/L (ref 16–63)
ANION GAP SERPL CALC-SCNC: 7 MMOL/L (ref 6–14)
AST SERPL-CCNC: 25 U/L (ref 15–37)
BILIRUB SERPL-MCNC: 0.5 MG/DL (ref 0.2–1)
BUN/CREAT SERPL: 38 (ref 6–20)
CA-I SERPL ISE-MCNC: 50 MG/DL (ref 8–26)
CALCIUM SERPL-MCNC: 8.6 MG/DL (ref 8.5–10.1)
CHLORIDE SERPL-SCNC: 112 MMOL/L (ref 98–107)
CO2 SERPL-SCNC: 29 MMOL/L (ref 21–32)
CREAT SERPL-MCNC: 1.3 MG/DL (ref 0.7–1.3)
ERYTHROCYTE [DISTWIDTH] IN BLOOD BY AUTOMATED COUNT: 14.3 % (ref 11.5–14.5)
ERYTHROCYTE [SEDIMENTATION RATE] IN BLOOD: 45 MM/H (ref 0–15)
GFR SERPLBLD BASED ON 1.73 SQ M-ARVRAT: 53.1 ML/MIN
GLOBULIN SER-MCNC: 3.6 G/DL (ref 2.2–3.8)
GLUCOSE SERPL-MCNC: 110 MG/DL (ref 70–99)
HCT VFR BLD CALC: 38.1 % (ref 39–53)
HGB BLD-MCNC: 12.6 G/DL (ref 13–17.5)
MCH RBC QN AUTO: 30 PG (ref 25–35)
MCHC RBC AUTO-ENTMCNC: 33 G/DL (ref 31–37)
MCV RBC AUTO: 91 FL (ref 79–100)
PLATELET # BLD AUTO: 249 X10^3/UL (ref 140–400)
POTASSIUM SERPL-SCNC: 4.5 MMOL/L (ref 3.5–5.1)
PROT SERPL-MCNC: 6.6 G/DL (ref 6.4–8.2)
RBC # BLD AUTO: 4.2 X10^6/UL (ref 4.3–5.7)
SODIUM SERPL-SCNC: 148 MMOL/L (ref 136–145)
WBC # BLD AUTO: 8.2 X10^3/UL (ref 4–11)

## 2020-01-12 RX ADMIN — NAPROXEN SCH MG: 250 TABLET ORAL at 20:01

## 2020-01-12 RX ADMIN — AMOXICILLIN AND CLAVULANATE POTASSIUM SCH TAB: 500; 125 TABLET, FILM COATED ORAL at 20:02

## 2020-01-12 RX ADMIN — NAPROXEN SCH MG: 250 TABLET ORAL at 08:16

## 2020-01-12 RX ADMIN — DONEPEZIL HYDROCHLORIDE SCH MG: 23 TABLET, FILM COATED ORAL at 20:01

## 2020-01-12 RX ADMIN — ASPIRIN 81 MG SCH MG: 81 TABLET ORAL at 08:16

## 2020-01-12 RX ADMIN — QUETIAPINE FUMARATE SCH MG: 25 TABLET, FILM COATED ORAL at 08:17

## 2020-01-12 RX ADMIN — NYSTATIN SCH APP: 100000 POWDER TOPICAL at 09:00

## 2020-01-12 RX ADMIN — CARBIDOPA AND LEVODOPA SCH TAB: 25; 100 TABLET ORAL at 14:17

## 2020-01-12 RX ADMIN — MIRTAZAPINE SCH MG: 7.5 TABLET, FILM COATED ORAL at 20:01

## 2020-01-12 RX ADMIN — ACETAMINOPHEN SCH MG: 160 SOLUTION ORAL at 08:15

## 2020-01-12 RX ADMIN — MELATONIN TAB 3 MG SCH MG: 3 TAB at 20:01

## 2020-01-12 RX ADMIN — MAGNESIUM HYDROXIDE SCH MG: 400 SUSPENSION ORAL at 20:01

## 2020-01-12 RX ADMIN — DIVALPROEX SODIUM SCH MG: 125 CAPSULE, COATED PELLETS ORAL at 17:33

## 2020-01-12 RX ADMIN — MEMANTINE HYDROCHLORIDE SCH MG: 10 TABLET ORAL at 08:16

## 2020-01-12 RX ADMIN — CARBIDOPA AND LEVODOPA SCH TAB: 25; 100 TABLET ORAL at 20:02

## 2020-01-12 RX ADMIN — MEMANTINE HYDROCHLORIDE SCH MG: 10 TABLET ORAL at 20:01

## 2020-01-12 RX ADMIN — POTASSIUM CHLORIDE SCH MEQ: 1500 TABLET, EXTENDED RELEASE ORAL at 08:17

## 2020-01-12 RX ADMIN — ACETAMINOPHEN SCH MG: 160 SOLUTION ORAL at 20:01

## 2020-01-12 RX ADMIN — CARBIDOPA AND LEVODOPA SCH TAB: 25; 100 TABLET ORAL at 08:16

## 2020-01-12 RX ADMIN — SERTRALINE HYDROCHLORIDE SCH MG: 50 TABLET ORAL at 08:17

## 2020-01-12 RX ADMIN — QUETIAPINE FUMARATE SCH MG: 25 TABLET, FILM COATED ORAL at 14:17

## 2020-01-12 RX ADMIN — NYSTATIN SCH APP: 100000 POWDER TOPICAL at 20:02

## 2020-01-12 RX ADMIN — QUETIAPINE FUMARATE SCH MG: 25 TABLET, FILM COATED ORAL at 17:33

## 2020-01-12 RX ADMIN — DIVALPROEX SODIUM SCH MG: 125 CAPSULE, COATED PELLETS ORAL at 08:16

## 2020-01-12 NOTE — PDOC
Exam


Note:


George Note:


Please also refer to the separate dictated note~for this date of service 

dictated separately.~Patient seen individually. Discussed the patient with 

Nursing staff reviewed the chart.~Reviewed interim history and current 

functioning. Reviewed vital signs,~Labs/ Radiology~and current medications noted

below. Continue current treatment with the changes noted in the dictated 

addendum note





Assessment:


Vital Signs/I&O:





                                   Vital Signs








  Date Time  Temp Pulse Resp B/P (MAP) Pulse Ox O2 Delivery O2 Flow Rate FiO2


 


1/12/20 15:49 97.2 63 20 137/80 (99) 96   


 


1/9/20 15:29      Room Air  














                                    I & O   


 


 1/11/20 1/11/20 1/12/20





 15:00 23:00 07:00


 


Intake Total 480 ml 240 ml 


 


Balance 480 ml 240 ml 








Labs:





                                Laboratory Tests








Test


 1/12/20


10:08


 


White Blood Count


 8.2 x10^3/uL


(4.0-11.0)


 


Red Blood Count


 4.20 x10^6/uL


(4.30-5.70)  L


 


Hemoglobin


 12.6 g/dL


(13.0-17.5)  L


 


Hematocrit


 38.1 %


(39.0-53.0)  L


 


Mean Corpuscular Volume


 91 fL ()





 


Mean Corpuscular Hemoglobin 30 pg (25-35)  


 


Mean Corpuscular Hemoglobin


Concent 33 g/dL


(31-37)


 


Red Cell Distribution Width


 14.3 %


(11.5-14.5)


 


Platelet Count


 249 x10^3/uL


(140-400)


 


Erythrocyte Sedimentation Rate 45 (0-15)  H


 


Sodium Level


 148 mmol/L


(136-145)  H


 


Potassium Level


 4.5 mmol/L


(3.5-5.1)


 


Chloride Level


 112 mmol/L


()  H


 


Carbon Dioxide Level


 29 mmol/L


(21-32)


 


Anion Gap 7 (6-14)  


 


Blood Urea Nitrogen


 50 mg/dL


(8-26)  H


 


Creatinine


 1.3 mg/dL


(0.7-1.3)


 


Estimated GFR


(Cockcroft-Gault) 53.1  





 


BUN/Creatinine Ratio 38 (6-20)  H


 


Glucose Level


 110 mg/dL


(70-99)  H


 


Calcium Level


 8.6 mg/dL


(8.5-10.1)


 


Total Bilirubin


 0.5 mg/dL


(0.2-1.0)


 


Aspartate Amino Transferase


(AST) 25 U/L (15-37)





 


Alanine Aminotransferase (ALT)


 11 U/L (16-63)


L


 


Alkaline Phosphatase


 93 U/L


()


 


Total Protein


 6.6 g/dL


(6.4-8.2)


 


Albumin


 3.0 g/dL


(3.4-5.0)  L


 


Albumin/Globulin Ratio


 0.8 (1.0-1.7)


L











Current Medications:


Meds:





Current Medications








 Medications


  (Trade)  Dose


 Ordered  Sig/Lamberto


 Route


 PRN Reason  Start Time


 Stop Time Status Last Admin


Dose Admin


 


 Amoxicillin/


 Clavulanate


 Potassium


  (Augmentin 500/


 125mg)  1 tab  TID


 PO


   1/12/20 18:00


 1/19/20 17:59  1/12/20 20:02











I have reviewed the current psychotropics carefully including drug interactions.

 Risk benefit ratio favors no change other than as noted in my dictated progress

note.





Diagnosis:


Problems:  


(1) Dementia with behavioral disturbance


(2) Anxiety disorder


(3) Dementia, vascular, with delusions


(4) Dementia, vascular, with depression


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


(7) Impulse control disorder











NIKHIL LAUREANO MD                 Jan 12, 2020 20:52

## 2020-01-13 VITALS — DIASTOLIC BLOOD PRESSURE: 75 MMHG | SYSTOLIC BLOOD PRESSURE: 158 MMHG

## 2020-01-13 VITALS — DIASTOLIC BLOOD PRESSURE: 82 MMHG | SYSTOLIC BLOOD PRESSURE: 138 MMHG

## 2020-01-13 RX ADMIN — POTASSIUM CHLORIDE SCH MEQ: 1500 TABLET, EXTENDED RELEASE ORAL at 08:32

## 2020-01-13 RX ADMIN — NAPROXEN SCH MG: 250 TABLET ORAL at 08:33

## 2020-01-13 RX ADMIN — QUETIAPINE FUMARATE SCH MG: 25 TABLET, FILM COATED ORAL at 13:00

## 2020-01-13 RX ADMIN — CARBIDOPA AND LEVODOPA SCH TAB: 25; 100 TABLET ORAL at 13:45

## 2020-01-13 RX ADMIN — QUETIAPINE FUMARATE SCH MG: 25 TABLET, FILM COATED ORAL at 13:45

## 2020-01-13 RX ADMIN — CARBIDOPA AND LEVODOPA SCH TAB: 25; 100 TABLET ORAL at 08:32

## 2020-01-13 RX ADMIN — QUETIAPINE FUMARATE SCH MG: 25 TABLET, FILM COATED ORAL at 08:32

## 2020-01-13 RX ADMIN — NYSTATIN SCH APP: 100000 POWDER TOPICAL at 20:29

## 2020-01-13 RX ADMIN — MAGNESIUM HYDROXIDE SCH MG: 400 SUSPENSION ORAL at 20:29

## 2020-01-13 RX ADMIN — MEMANTINE HYDROCHLORIDE SCH MG: 10 TABLET ORAL at 20:28

## 2020-01-13 RX ADMIN — ASPIRIN 81 MG SCH MG: 81 TABLET ORAL at 08:32

## 2020-01-13 RX ADMIN — CARBIDOPA AND LEVODOPA SCH TAB: 25; 100 TABLET ORAL at 14:00

## 2020-01-13 RX ADMIN — DONEPEZIL HYDROCHLORIDE SCH MG: 23 TABLET, FILM COATED ORAL at 20:29

## 2020-01-13 RX ADMIN — Medication SCH CAP: at 20:30

## 2020-01-13 RX ADMIN — NAPROXEN SCH MG: 250 TABLET ORAL at 20:29

## 2020-01-13 RX ADMIN — QUETIAPINE FUMARATE SCH MG: 25 TABLET, FILM COATED ORAL at 17:00

## 2020-01-13 RX ADMIN — CARBIDOPA AND LEVODOPA SCH TAB: 25; 100 TABLET ORAL at 20:29

## 2020-01-13 RX ADMIN — AMOXICILLIN AND CLAVULANATE POTASSIUM SCH TAB: 500; 125 TABLET, FILM COATED ORAL at 13:45

## 2020-01-13 RX ADMIN — ACETAMINOPHEN SCH MG: 160 SOLUTION ORAL at 08:31

## 2020-01-13 RX ADMIN — SERTRALINE HYDROCHLORIDE SCH MG: 50 TABLET ORAL at 08:33

## 2020-01-13 RX ADMIN — DIVALPROEX SODIUM SCH MG: 125 CAPSULE, COATED PELLETS ORAL at 08:32

## 2020-01-13 RX ADMIN — NYSTATIN SCH APP: 100000 POWDER TOPICAL at 08:32

## 2020-01-13 RX ADMIN — MELATONIN TAB 3 MG SCH MG: 3 TAB at 20:28

## 2020-01-13 RX ADMIN — AMOXICILLIN AND CLAVULANATE POTASSIUM SCH TAB: 500; 125 TABLET, FILM COATED ORAL at 08:32

## 2020-01-13 RX ADMIN — DIVALPROEX SODIUM SCH MG: 125 CAPSULE, COATED PELLETS ORAL at 17:00

## 2020-01-13 RX ADMIN — MEMANTINE HYDROCHLORIDE SCH MG: 10 TABLET ORAL at 08:33

## 2020-01-13 RX ADMIN — MIRTAZAPINE SCH MG: 7.5 TABLET, FILM COATED ORAL at 20:29

## 2020-01-13 RX ADMIN — AMOXICILLIN AND CLAVULANATE POTASSIUM SCH TAB: 500; 125 TABLET, FILM COATED ORAL at 14:00

## 2020-01-13 RX ADMIN — TRAZODONE HYDROCHLORIDE PRN MG: 50 TABLET, FILM COATED ORAL at 20:29

## 2020-01-13 RX ADMIN — ACETAMINOPHEN SCH MG: 160 SOLUTION ORAL at 20:29

## 2020-01-13 NOTE — PDOC
Exam


Note:


George Note:


Please also refer to the separate dictated note~for this date of service 

dictated separately.~Patient seen individually. Discussed the patient with 

Nursing staff reviewed the chart.~Reviewed interim history and current 

functioning. Reviewed vital signs,~Labs/ Radiology~and current medications noted

below. Continue current treatment with the changes noted in the dictated 

addendum note





Assessment:


Vital Signs/I&O:





                                   Vital Signs








  Date Time  Temp Pulse Resp B/P (MAP) Pulse Ox O2 Delivery O2 Flow Rate FiO2


 


1/13/20 15:51 97.6 67 17 138/82 (100) 94 Room Air  














                                    I & O   


 


 1/12/20 1/12/20 1/13/20





 15:00 23:00 07:00


 


Intake Total 720 ml 120 ml 


 


Balance 720 ml 120 ml 











Current Medications:


Meds:





Current Medications








 Medications


  (Trade)  Dose


 Ordered  Sig/Lamberto


 Route


 PRN Reason  Start Time


 Stop Time Status Last Admin


Dose Admin


 


 Penicillin G


 Benzathine


  (Bicillin L-A)  2,400,000


 unit  1X  ONCE


 IM


   1/13/20 21:00


 1/13/20 21:01 DC 1/13/20 21:08











I have reviewed the current psychotropics carefully including drug interactions.

 Risk benefit ratio favors no change other than as noted in my dictated progress

note.





Diagnosis:


Problems:  


(1) Dementia with behavioral disturbance


(2) Anxiety disorder


(3) Dementia, vascular, with delusions


(4) Dementia, vascular, with depression


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


(7) Impulse control disorder











NIKHIL LAUREANO MD                 Jan 13, 2020 21:12

## 2020-01-13 NOTE — PN
DATE:  01/11/2020



PSYCHIATRIC PROGRESS NOTE.



This late entry of 01/11/2020 covers the elements not covered in my initial

note.



SUBJECTIVE:  I met with the patient in the evening of 01/11/2020.  The patient

slept for 4-3/4 hours previous night.  The patient remains confused, somewhat

delirious at times.  We are awaiting the results of the culture and sensitivity

to determine if this could be compounding his confusion consequent to UTI.  He

put himself on the floor on 01/11/2020 and previous night was "horrible" per

nursing report.  He was quite labile and agitated.



REVIEW OF SYSTEMS:  No CV, , pulmonary, eye, ENT system symptoms on review. 

Reliability is poor.



MENTAL STATUS EXAM:  Oriented to himself.  Insight, judgment, recent and remote

memory, attention, concentration, fund of knowledge are poor, consistent with

his diagnosis mentioned in my initial note.



IMPRESSION:  Major neurocognitive disorder, Alzheimer, vascular with delusion;

depression; behavioral disturbance; anxiety disorder, unspecified; impulse

control disorder, unspecified; rest unchanged.



PLAN:  No change from initial note.  Check CBC and CMP.  Await UA, C and S, and

treat as indicated.  Rest unchanged.





______________________________

NIKHIL LAUREANO MD



DR:  HARRY/ashly  JOB#:  [id number] / [id number]

DD:  01/12/2020 16:51  DT:  01/12/2020 21:36

## 2020-01-13 NOTE — PN
DATE:  01/10/2020



PSYCHIATRIC PROGRESS NOTE



This late entry 01/10/2020 covers elements not covered in my initial note.



SUBJECTIVE:  I met with the patient in the evening of 01/10/2020.  Per DERIAN Roach, the patient slept 7-1/2 hours previous night.  He was combative previous

night, drowsy during the day on 01/10/2020, compliant with medications.  He has

had some muscle stiffness.  We will consult Dr. Webber, Neurology for this. 

Urine has reflex to culture, this may account for some of his increased

confusion as well.



REVIEW OF SYSTEMS:  No CV, , pulmonary, eye, ENT system symptoms on review. 

Reliability poor.



MENTAL STATUS EXAM:  Oriented to himself.  Insight, judgment, recent and remote

memory, attention, concentration, fund of knowledge poor, consistent with his

diagnosis mentioned in my initial note.



PLAN:  No change from initial note.  Treat UTI once we receive the results. 

Consult Neurology.  Maintain Aricept, melatonin, Remeron, Namenda, Zyprexa

p.r.n., trazodone p.r.n., Seroquel.





______________________________

MAN DELVIS LAUREANO MD



DR:  HARRY/ashly  JOB#:  [id number] / [id number]

DD:  01/12/2020 11:00  DT:  01/12/2020 15:53

## 2020-01-14 VITALS — DIASTOLIC BLOOD PRESSURE: 63 MMHG | SYSTOLIC BLOOD PRESSURE: 133 MMHG

## 2020-01-14 VITALS — SYSTOLIC BLOOD PRESSURE: 147 MMHG | DIASTOLIC BLOOD PRESSURE: 78 MMHG

## 2020-01-14 RX ADMIN — CARBIDOPA AND LEVODOPA SCH TAB: 25; 100 TABLET ORAL at 19:25

## 2020-01-14 RX ADMIN — NYSTATIN SCH APP: 100000 POWDER TOPICAL at 11:53

## 2020-01-14 RX ADMIN — MIRTAZAPINE SCH MG: 7.5 TABLET, FILM COATED ORAL at 19:25

## 2020-01-14 RX ADMIN — CARBIDOPA AND LEVODOPA SCH TAB: 25; 100 TABLET ORAL at 11:54

## 2020-01-14 RX ADMIN — MEMANTINE HYDROCHLORIDE SCH MG: 10 TABLET ORAL at 11:55

## 2020-01-14 RX ADMIN — MELATONIN TAB 3 MG SCH MG: 3 TAB at 19:25

## 2020-01-14 RX ADMIN — SERTRALINE HYDROCHLORIDE SCH MG: 50 TABLET ORAL at 11:56

## 2020-01-14 RX ADMIN — QUETIAPINE FUMARATE SCH MG: 25 TABLET, FILM COATED ORAL at 09:00

## 2020-01-14 RX ADMIN — DIVALPROEX SODIUM SCH MG: 125 CAPSULE, COATED PELLETS ORAL at 11:54

## 2020-01-14 RX ADMIN — DIVALPROEX SODIUM SCH MG: 125 CAPSULE, COATED PELLETS ORAL at 17:46

## 2020-01-14 RX ADMIN — MAGNESIUM HYDROXIDE SCH MG: 400 SUSPENSION ORAL at 19:24

## 2020-01-14 RX ADMIN — NAPROXEN SCH MG: 250 TABLET ORAL at 19:24

## 2020-01-14 RX ADMIN — Medication SCH CAP: at 11:54

## 2020-01-14 RX ADMIN — DONEPEZIL HYDROCHLORIDE SCH MG: 23 TABLET, FILM COATED ORAL at 19:25

## 2020-01-14 RX ADMIN — CARBIDOPA AND LEVODOPA SCH TAB: 25; 100 TABLET ORAL at 14:00

## 2020-01-14 RX ADMIN — DIVALPROEX SODIUM SCH MG: 125 CAPSULE, COATED PELLETS ORAL at 09:00

## 2020-01-14 RX ADMIN — NYSTATIN SCH APP: 100000 POWDER TOPICAL at 19:25

## 2020-01-14 RX ADMIN — NAPROXEN SCH MG: 250 TABLET ORAL at 11:55

## 2020-01-14 RX ADMIN — QUETIAPINE FUMARATE SCH MG: 25 TABLET, FILM COATED ORAL at 11:55

## 2020-01-14 RX ADMIN — Medication SCH CAP: at 19:24

## 2020-01-14 RX ADMIN — ACETAMINOPHEN SCH MG: 160 SOLUTION ORAL at 11:53

## 2020-01-14 RX ADMIN — QUETIAPINE FUMARATE SCH MG: 25 TABLET, FILM COATED ORAL at 17:46

## 2020-01-14 RX ADMIN — QUETIAPINE FUMARATE SCH MG: 25 TABLET, FILM COATED ORAL at 13:00

## 2020-01-14 RX ADMIN — ASPIRIN 81 MG SCH MG: 81 TABLET ORAL at 11:54

## 2020-01-14 RX ADMIN — POTASSIUM CHLORIDE SCH MEQ: 1500 TABLET, EXTENDED RELEASE ORAL at 11:54

## 2020-01-14 RX ADMIN — MEMANTINE HYDROCHLORIDE SCH MG: 10 TABLET ORAL at 19:25

## 2020-01-14 RX ADMIN — ACETAMINOPHEN SCH MG: 160 SOLUTION ORAL at 19:24

## 2020-01-14 NOTE — PN
DATE:  01/12/2020



PSYCHIATRIC PROGRESS NOTE



This late entry 01/12/2020 covers elements not covered in my initial note.



SUBJECTIVE:  I met with the patient evening of 01/12/2020.  Per DERIAN Zee, the

patient slept 6-1/4 hours previous night.  He did have a fall, received a cut

above his right upper eyebrow.  Steri-Strips have been applied.  No stitches

needed, deferred to Dr. Schreiber.  He was leaning forward and fell out of his chair

here.  Now, he remains on line of sight.  He has also been started on Augmentin

for his UTI after urine C and S was returned.



REVIEW OF SYSTEMS:  No CV, , pulmonary, eye, ENT system symptoms on review. 

Very hard of hearing.  Reliability poor.



MENTAL STATUS EXAM:  Oriented to himself.  Insight, judgment, recent and remote

memory, attention, concentration, fund of knowledge poor, consistent with his

diagnosis.



IMPRESSION:  Major neurocognitive disorder, Alzheimer, vascular with delusion,

depression, behavioral disturbance; anxiety disorder, unspecified; impulse

control disorder, unspecified; urinary tract infection.



PLAN:  Continue psychotropics from initial note.  Treat the UTI.  Adjust further

as clinically indicated.





______________________________

NIKHIL LAUREANO MD



DR:  HARRY/ashly  JOB#:  [id number] / [id number]

DD:  01/13/2020 16:08  DT:  01/14/2020 01:50

## 2020-01-14 NOTE — PDOC
Exam


Note:


George Note:


Please also refer to the separate dictated note~for this date of service 

dictated separately.~Patient seen individually. Discussed the patient with 

Nursing staff reviewed the chart.~Reviewed interim history and current 

functioning. Reviewed vital signs,~Labs/ Radiology~and current medications noted

below. Continue current treatment with the changes noted in the dictated 

addendum note





Assessment:


Vital Signs/I&O:





                                   Vital Signs








  Date Time  Temp Pulse Resp B/P (MAP) Pulse Ox O2 Delivery O2 Flow Rate FiO2


 


1/14/20 15:46 97.4 51 16 147/78 (101) 95   


 


1/13/20 15:51      Room Air  














                                    I & O   


 


 1/13/20 1/13/20 1/14/20





 15:00 23:00 07:00


 


Intake Total 240 ml 0 ml 


 


Balance 240 ml 0 ml 











Current Medications:


I have reviewed the current psychotropics carefully including drug interactions.

 Risk benefit ratio favors no change other than as noted in my dictated progress

note.





Diagnosis:


Problems:  


(1) Dementia with behavioral disturbance


(2) Anxiety disorder


(3) Dementia, vascular, with delusions


(4) Dementia, vascular, with depression


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


(7) Impulse control disorder











NIKHIL LAUREANO MD                 Jan 14, 2020 21:16

## 2020-01-15 VITALS — DIASTOLIC BLOOD PRESSURE: 64 MMHG | SYSTOLIC BLOOD PRESSURE: 98 MMHG

## 2020-01-15 VITALS — SYSTOLIC BLOOD PRESSURE: 106 MMHG | DIASTOLIC BLOOD PRESSURE: 52 MMHG

## 2020-01-15 RX ADMIN — NYSTATIN SCH APP: 100000 POWDER TOPICAL at 19:24

## 2020-01-15 RX ADMIN — MIRTAZAPINE SCH MG: 7.5 TABLET, FILM COATED ORAL at 19:24

## 2020-01-15 RX ADMIN — QUETIAPINE FUMARATE SCH MG: 25 TABLET, FILM COATED ORAL at 13:00

## 2020-01-15 RX ADMIN — DIVALPROEX SODIUM SCH MG: 125 CAPSULE, COATED PELLETS ORAL at 17:38

## 2020-01-15 RX ADMIN — SERTRALINE HYDROCHLORIDE SCH MG: 50 TABLET ORAL at 10:19

## 2020-01-15 RX ADMIN — QUETIAPINE FUMARATE SCH MG: 25 TABLET, FILM COATED ORAL at 17:37

## 2020-01-15 RX ADMIN — CARBIDOPA AND LEVODOPA SCH TAB: 25; 100 TABLET ORAL at 14:00

## 2020-01-15 RX ADMIN — CARBIDOPA AND LEVODOPA SCH TAB: 25; 100 TABLET ORAL at 10:19

## 2020-01-15 RX ADMIN — MAGNESIUM HYDROXIDE SCH MG: 400 SUSPENSION ORAL at 19:23

## 2020-01-15 RX ADMIN — CARBIDOPA AND LEVODOPA SCH TAB: 25; 100 TABLET ORAL at 19:24

## 2020-01-15 RX ADMIN — QUETIAPINE FUMARATE SCH MG: 25 TABLET, FILM COATED ORAL at 10:19

## 2020-01-15 RX ADMIN — Medication SCH CAP: at 10:22

## 2020-01-15 RX ADMIN — MEMANTINE HYDROCHLORIDE SCH MG: 10 TABLET ORAL at 10:22

## 2020-01-15 RX ADMIN — MEMANTINE HYDROCHLORIDE SCH MG: 10 TABLET ORAL at 19:24

## 2020-01-15 RX ADMIN — ASPIRIN 81 MG SCH MG: 81 TABLET ORAL at 10:22

## 2020-01-15 RX ADMIN — NYSTATIN SCH APP: 100000 POWDER TOPICAL at 09:00

## 2020-01-15 RX ADMIN — NAPROXEN SCH MG: 250 TABLET ORAL at 10:20

## 2020-01-15 RX ADMIN — NAPROXEN SCH MG: 250 TABLET ORAL at 19:24

## 2020-01-15 RX ADMIN — DONEPEZIL HYDROCHLORIDE SCH MG: 23 TABLET, FILM COATED ORAL at 19:24

## 2020-01-15 RX ADMIN — Medication SCH CAP: at 19:24

## 2020-01-15 RX ADMIN — DIVALPROEX SODIUM SCH MG: 125 CAPSULE, COATED PELLETS ORAL at 10:19

## 2020-01-15 RX ADMIN — MELATONIN TAB 3 MG SCH MG: 3 TAB at 19:24

## 2020-01-15 RX ADMIN — ACETAMINOPHEN SCH MG: 160 SOLUTION ORAL at 10:19

## 2020-01-15 RX ADMIN — POTASSIUM CHLORIDE SCH MEQ: 1500 TABLET, EXTENDED RELEASE ORAL at 10:21

## 2020-01-15 RX ADMIN — ACETAMINOPHEN SCH MG: 160 SOLUTION ORAL at 19:23

## 2020-01-15 NOTE — PDOC
Exam


Note:


George Note:


Please also refer to the separate dictated note~for this date of service 

dictated separately.~Patient seen individually. Discussed the patient with 

Nursing staff reviewed the chart.~Reviewed interim history and current 

functioning. Reviewed vital signs,~Labs/ Radiology~and current medications noted

below. Continue current treatment with the changes noted in the dictated 

addendum note





Assessment:


Vital Signs/I&O:





                                   Vital Signs








  Date Time  Temp Pulse Resp B/P (MAP) Pulse Ox O2 Delivery O2 Flow Rate FiO2


 


1/15/20 15:54 97.1 68 20 98/64 (75)    


 


1/15/20 05:49     94   


 


1/13/20 15:51      Room Air  














                                    I & O   


 


 1/14/20 1/14/20 1/15/20





 15:00 23:00 07:00


 


Intake Total 360 ml 580 ml 


 


Balance 360 ml 580 ml 











Current Medications:


I have reviewed the current psychotropics carefully including drug interactions.

 Risk benefit ratio favors no change other than as noted in my dictated progress

note.





Diagnosis:


Problems:  


(1) Dementia with behavioral disturbance


(2) Anxiety disorder


(3) Dementia, vascular, with delusions


(4) Dementia, vascular, with depression


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


(7) Impulse control disorder











NIKHIL LAUREANO MD                 Silverio 15, 2020 22:05
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
## 2020-01-15 NOTE — PN
DATE:  01/13/2020



PSYCHIATRIC PROGRESS NOTE



This late entry 01/13/2020 covers elements not covered in my initial note.



SUBJECTIVE:  I met with the patient in the evening of 01/13/2020.  The patient

slept 7-1/4 hours previous night per DERIAN Elliott.  He was agitated in the

morning briefly and then pleasant after that, irritable, combative as the day

went on.  Augmentin has been started for his UTI, received Zyprexa p.r.n.



REVIEW OF SYSTEMS:  Hard of hearing.  No CV, , pulmonary, eye, ENT system

symptoms on review.  Reliability poor.



MENTAL STATUS EXAM:  Oriented to himself.  Insight, judgment, recent and remote

memory, attention, concentration, fund of knowledge poor, consistent with his

diagnosis mentioned in my initial note.



PLAN:  No change from initial note.





______________________________

MAN DELVIS LAUREANO MD



DR:  HARRY/ashly  JOB#:  [id number] / [id number]

DD:  01/14/2020 16:26  DT:  01/14/2020 21:57

## 2020-01-16 VITALS — DIASTOLIC BLOOD PRESSURE: 69 MMHG | SYSTOLIC BLOOD PRESSURE: 118 MMHG

## 2020-01-16 VITALS — DIASTOLIC BLOOD PRESSURE: 74 MMHG | SYSTOLIC BLOOD PRESSURE: 159 MMHG

## 2020-01-16 RX ADMIN — NYSTATIN SCH APP: 100000 POWDER TOPICAL at 20:44

## 2020-01-16 RX ADMIN — QUETIAPINE FUMARATE SCH MG: 25 TABLET, FILM COATED ORAL at 12:47

## 2020-01-16 RX ADMIN — NAPROXEN SCH MG: 250 TABLET ORAL at 20:44

## 2020-01-16 RX ADMIN — NAPROXEN SCH MG: 250 TABLET ORAL at 09:10

## 2020-01-16 RX ADMIN — MEMANTINE HYDROCHLORIDE SCH MG: 10 TABLET ORAL at 20:43

## 2020-01-16 RX ADMIN — Medication SCH CAP: at 09:09

## 2020-01-16 RX ADMIN — CARBIDOPA AND LEVODOPA SCH TAB: 25; 100 TABLET ORAL at 12:47

## 2020-01-16 RX ADMIN — ACETAMINOPHEN SCH MG: 160 SOLUTION ORAL at 09:09

## 2020-01-16 RX ADMIN — CARBIDOPA AND LEVODOPA SCH TAB: 25; 100 TABLET ORAL at 09:10

## 2020-01-16 RX ADMIN — DONEPEZIL HYDROCHLORIDE SCH MG: 23 TABLET, FILM COATED ORAL at 20:43

## 2020-01-16 RX ADMIN — MAGNESIUM HYDROXIDE SCH MG: 400 SUSPENSION ORAL at 20:44

## 2020-01-16 RX ADMIN — MEMANTINE HYDROCHLORIDE SCH MG: 10 TABLET ORAL at 09:10

## 2020-01-16 RX ADMIN — DIVALPROEX SODIUM SCH MG: 125 CAPSULE, COATED PELLETS ORAL at 16:45

## 2020-01-16 RX ADMIN — QUETIAPINE FUMARATE SCH MG: 25 TABLET, FILM COATED ORAL at 09:10

## 2020-01-16 RX ADMIN — MIRTAZAPINE SCH MG: 7.5 TABLET, FILM COATED ORAL at 20:44

## 2020-01-16 RX ADMIN — QUETIAPINE FUMARATE SCH MG: 25 TABLET, FILM COATED ORAL at 16:45

## 2020-01-16 RX ADMIN — Medication SCH CAP: at 20:43

## 2020-01-16 RX ADMIN — NYSTATIN SCH APP: 100000 POWDER TOPICAL at 09:11

## 2020-01-16 RX ADMIN — DIVALPROEX SODIUM SCH MG: 125 CAPSULE, COATED PELLETS ORAL at 09:09

## 2020-01-16 RX ADMIN — POTASSIUM CHLORIDE SCH MEQ: 1500 TABLET, EXTENDED RELEASE ORAL at 09:09

## 2020-01-16 RX ADMIN — ACETAMINOPHEN SCH MG: 160 SOLUTION ORAL at 20:44

## 2020-01-16 RX ADMIN — SERTRALINE HYDROCHLORIDE SCH MG: 50 TABLET ORAL at 09:09

## 2020-01-16 RX ADMIN — CARBIDOPA AND LEVODOPA SCH TAB: 25; 100 TABLET ORAL at 20:43

## 2020-01-16 RX ADMIN — ASPIRIN 81 MG SCH MG: 81 TABLET ORAL at 09:09

## 2020-01-16 RX ADMIN — MELATONIN TAB 3 MG SCH MG: 3 TAB at 20:44

## 2020-01-16 NOTE — RAD
Left hand 3 views portable:

 

Reason for examination: Swollen left pinky.

 

No acute fracture or dislocation is seen. The bone density is normal. No 

abnormal periosteal reaction is seen. There are some degenerative changes 

at cerebral interphalangeal joints. This is slightly more prominent at the

proximal interphalangeal joint of the fifth digit.

 

IMPRESSION:

 

Degenerative changes at several interphalangeal joints which is slightly 

more prominent at the proximal interphalangeal joint of the fifth digit. 

No acute bony abnormality seen.

 

Electronically signed by: Radha Portillo MD (1/16/2020 4:03 AM) [facility] [place]

## 2020-01-16 NOTE — PDOC
Exam


Note:


George Note:


Please also refer to the separate dictated note~for this date of service 

dictated separately.~Patient seen individually. Discussed the patient with 

Nursing staff reviewed the chart.~Reviewed interim history and current 

functioning. Reviewed vital signs,~Labs/ Radiology~and current medications noted

below. Continue current treatment with the changes noted in the dictated 

addendum note





Assessment:


Vital Signs/I&O:





                                   Vital Signs








  Date Time  Temp Pulse Resp B/P (MAP) Pulse Ox O2 Delivery O2 Flow Rate FiO2


 


1/16/20 16:08 98.1 58 20 118/69 (85) 99 Room Air  














                                    I & O   


 


 1/15/20 1/15/20 1/16/20





 15:00 23:00 07:00


 


Intake Total 480 ml 900 ml 


 


Balance 480 ml 900 ml 











Current Medications:


I have reviewed the current psychotropics carefully including drug interactions.

 Risk benefit ratio favors no change other than as noted in my dictated progress

note.





Diagnosis:


Problems:  


(1) Dementia with behavioral disturbance


(2) Anxiety disorder


(3) Dementia, vascular, with delusions


(4) Dementia, vascular, with depression


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


(7) Impulse control disorder











NIKHIL LAUREANO MD                 Jan 16, 2020 20:57

## 2020-01-17 VITALS — SYSTOLIC BLOOD PRESSURE: 128 MMHG | DIASTOLIC BLOOD PRESSURE: 61 MMHG

## 2020-01-17 LAB
ALBUMIN SERPL-MCNC: 3.1 G/DL (ref 3.4–5)
ALBUMIN/GLOB SERPL: 0.8 {RATIO} (ref 1–1.7)
ALP SERPL-CCNC: 106 U/L (ref 46–116)
ALT SERPL-CCNC: 23 U/L (ref 16–63)
AMORPH SED URNS QL MICRO: PRESENT /HPF
ANION GAP SERPL CALC-SCNC: 5 MMOL/L (ref 6–14)
APTT PPP: YELLOW S
AST SERPL-CCNC: 27 U/L (ref 15–37)
BACTERIA #/AREA URNS HPF: (no result) /HPF
BASOPHILS # BLD AUTO: 0.1 X10^3/UL (ref 0–0.2)
BASOPHILS NFR BLD: 1 % (ref 0–3)
BILIRUB SERPL-MCNC: 0.5 MG/DL (ref 0.2–1)
BILIRUB UR QL STRIP: (no result)
BUN/CREAT SERPL: 36 (ref 6–20)
CA-I SERPL ISE-MCNC: 43 MG/DL (ref 8–26)
CALCIUM SERPL-MCNC: 8 MG/DL (ref 8.5–10.1)
CHLORIDE SERPL-SCNC: 108 MMOL/L (ref 98–107)
CO2 SERPL-SCNC: 29 MMOL/L (ref 21–32)
CREAT SERPL-MCNC: 1.2 MG/DL (ref 0.7–1.3)
EOSINOPHIL NFR BLD: 0.2 X10^3/UL (ref 0–0.7)
EOSINOPHIL NFR BLD: 3 % (ref 0–3)
ERYTHROCYTE [DISTWIDTH] IN BLOOD BY AUTOMATED COUNT: 14.5 % (ref 11.5–14.5)
FIBRINOGEN PPP-MCNC: (no result) MG/DL
GFR SERPLBLD BASED ON 1.73 SQ M-ARVRAT: 58.3 ML/MIN
GLOBULIN SER-MCNC: 3.7 G/DL (ref 2.2–3.8)
GLUCOSE SERPL-MCNC: 90 MG/DL (ref 70–99)
GLUCOSE UR STRIP-MCNC: (no result) MG/DL
HCT VFR BLD CALC: 39 % (ref 39–53)
HGB BLD-MCNC: 12.7 G/DL (ref 13–17.5)
HYALINE CASTS #/AREA URNS LPF: (no result) /HPF
LYMPHOCYTES # BLD: 1.5 X10^3/UL (ref 1–4.8)
LYMPHOCYTES NFR BLD AUTO: 21 % (ref 24–48)
MCH RBC QN AUTO: 30 PG (ref 25–35)
MCHC RBC AUTO-ENTMCNC: 33 G/DL (ref 31–37)
MCV RBC AUTO: 92 FL (ref 79–100)
MONO #: 0.5 X10^3/UL (ref 0–1.1)
MONOCYTES NFR BLD: 7 % (ref 0–9)
NEUT #: 4.8 X10^3UL (ref 1.8–7.7)
NEUTROPHILS NFR BLD AUTO: 68 % (ref 31–73)
NITRITE UR QL STRIP: (no result)
PLATELET # BLD AUTO: 246 X10^3/UL (ref 140–400)
POTASSIUM SERPL-SCNC: 4.7 MMOL/L (ref 3.5–5.1)
PROT SERPL-MCNC: 6.8 G/DL (ref 6.4–8.2)
RBC # BLD AUTO: 4.24 X10^6/UL (ref 4.3–5.7)
SODIUM SERPL-SCNC: 142 MMOL/L (ref 136–145)
SP GR UR STRIP: 1.02
SQUAMOUS #/AREA URNS LPF: (no result) /LPF
UROBILINOGEN UR-MCNC: 0.2 MG/DL
WBC # BLD AUTO: 7.1 X10^3/UL (ref 4–11)
WBC #/AREA URNS HPF: (no result) /HPF (ref 0–4)

## 2020-01-17 RX ADMIN — Medication SCH CAP: at 20:18

## 2020-01-17 RX ADMIN — CARBIDOPA AND LEVODOPA SCH TAB: 25; 100 TABLET ORAL at 08:07

## 2020-01-17 RX ADMIN — QUETIAPINE FUMARATE SCH MG: 25 TABLET, FILM COATED ORAL at 17:00

## 2020-01-17 RX ADMIN — CARBIDOPA AND LEVODOPA SCH TAB: 25; 100 TABLET ORAL at 12:05

## 2020-01-17 RX ADMIN — NAPROXEN SCH MG: 250 TABLET ORAL at 08:09

## 2020-01-17 RX ADMIN — Medication SCH CAP: at 08:41

## 2020-01-17 RX ADMIN — MAGNESIUM HYDROXIDE SCH MG: 400 SUSPENSION ORAL at 20:19

## 2020-01-17 RX ADMIN — NYSTATIN SCH APP: 100000 POWDER TOPICAL at 08:10

## 2020-01-17 RX ADMIN — Medication SCH CAP: at 08:08

## 2020-01-17 RX ADMIN — DIVALPROEX SODIUM SCH MG: 125 CAPSULE, COATED PELLETS ORAL at 17:00

## 2020-01-17 RX ADMIN — ASPIRIN 81 MG SCH MG: 81 TABLET ORAL at 08:08

## 2020-01-17 RX ADMIN — QUETIAPINE FUMARATE SCH MG: 25 TABLET, FILM COATED ORAL at 12:05

## 2020-01-17 RX ADMIN — DIVALPROEX SODIUM SCH MG: 125 CAPSULE, COATED PELLETS ORAL at 08:10

## 2020-01-17 RX ADMIN — MEMANTINE HYDROCHLORIDE SCH MG: 10 TABLET ORAL at 08:42

## 2020-01-17 RX ADMIN — CARBIDOPA AND LEVODOPA SCH TAB: 25; 100 TABLET ORAL at 20:17

## 2020-01-17 RX ADMIN — NAPROXEN SCH MG: 250 TABLET ORAL at 08:42

## 2020-01-17 RX ADMIN — ACETAMINOPHEN SCH MG: 160 SOLUTION ORAL at 20:18

## 2020-01-17 RX ADMIN — MEMANTINE HYDROCHLORIDE SCH MG: 10 TABLET ORAL at 20:18

## 2020-01-17 RX ADMIN — TRAZODONE HYDROCHLORIDE PRN MG: 50 TABLET, FILM COATED ORAL at 21:41

## 2020-01-17 RX ADMIN — CARBIDOPA AND LEVODOPA SCH TAB: 25; 100 TABLET ORAL at 08:42

## 2020-01-17 RX ADMIN — DONEPEZIL HYDROCHLORIDE SCH MG: 23 TABLET, FILM COATED ORAL at 20:18

## 2020-01-17 RX ADMIN — ACETAMINOPHEN SCH MG: 160 SOLUTION ORAL at 08:10

## 2020-01-17 RX ADMIN — DIVALPROEX SODIUM SCH MG: 125 CAPSULE, COATED PELLETS ORAL at 08:41

## 2020-01-17 RX ADMIN — MIRTAZAPINE SCH MG: 7.5 TABLET, FILM COATED ORAL at 20:18

## 2020-01-17 RX ADMIN — MEMANTINE HYDROCHLORIDE SCH MG: 10 TABLET ORAL at 08:09

## 2020-01-17 RX ADMIN — POTASSIUM CHLORIDE SCH MEQ: 1500 TABLET, EXTENDED RELEASE ORAL at 08:09

## 2020-01-17 RX ADMIN — NAPROXEN SCH MG: 250 TABLET ORAL at 20:17

## 2020-01-17 RX ADMIN — ACETAMINOPHEN SCH MG: 160 SOLUTION ORAL at 08:42

## 2020-01-17 RX ADMIN — MELATONIN TAB 3 MG SCH MG: 3 TAB at 20:17

## 2020-01-17 RX ADMIN — QUETIAPINE FUMARATE SCH MG: 25 TABLET, FILM COATED ORAL at 08:08

## 2020-01-17 RX ADMIN — NYSTATIN SCH APP: 100000 POWDER TOPICAL at 20:18

## 2020-01-17 RX ADMIN — SERTRALINE HYDROCHLORIDE SCH MG: 50 TABLET ORAL at 08:08

## 2020-01-17 NOTE — PDOC
Exam


Note:


George Note:


Please also refer to the separate dictated note~for this date of service 

dictated separately.~Patient seen individually. Discussed the patient with 

Nursing staff reviewed the chart.~Reviewed interim history and current 

functioning. Reviewed vital signs,~Labs/ Radiology~and current medications noted

below. Continue current treatment with the changes noted in the dictated 

addendum note





Assessment:


Vital Signs/I&O:





                                   Vital Signs








  Date Time  Temp Pulse Resp B/P (MAP) Pulse Ox O2 Delivery O2 Flow Rate FiO2


 


1/17/20 08:08  59  128/71    


 


1/17/20 06:06 95.7  18  98 Room Air  














                                    I & O   


 


 1/16/20 1/16/20 1/17/20





 15:00 23:00 07:00


 


Intake Total 1080 ml 240 ml 240 ml


 


Balance 1080 ml 240 ml 240 ml








Labs:





                                Laboratory Tests








Test


 1/17/20


10:42 1/17/20


17:30


 


White Blood Count


 7.1 x10^3/uL


(4.0-11.0) 





 


Red Blood Count


 4.24 x10^6/uL


(4.30-5.70)  L 





 


Hemoglobin


 12.7 g/dL


(13.0-17.5)  L 





 


Hematocrit


 39.0 %


(39.0-53.0) 





 


Mean Corpuscular Volume


 92 fL ()


 





 


Mean Corpuscular Hemoglobin 30 pg (25-35)   


 


Mean Corpuscular Hemoglobin


Concent 33 g/dL


(31-37) 





 


Red Cell Distribution Width


 14.5 %


(11.5-14.5) 





 


Platelet Count


 246 x10^3/uL


(140-400) 





 


Neutrophils (%) (Auto) 68 % (31-73)   


 


Lymphocytes (%) (Auto) 21 % (24-48)  L 


 


Monocytes (%) (Auto) 7 % (0-9)   


 


Eosinophils (%) (Auto) 3 % (0-3)   


 


Basophils (%) (Auto) 1 % (0-3)   


 


Neutrophils # (Auto)


 4.8 x10^3uL


(1.8-7.7) 





 


Lymphocytes # (Auto)


 1.5 x10^3/uL


(1.0-4.8) 





 


Monocytes # (Auto)


 0.5 x10^3/uL


(0.0-1.1) 





 


Eosinophils # (Auto)


 0.2 x10^3/uL


(0.0-0.7) 





 


Basophils # (Auto)


 0.1 x10^3/uL


(0.0-0.2) 





 


Sodium Level


 142 mmol/L


(136-145) 





 


Potassium Level


 4.7 mmol/L


(3.5-5.1) 





 


Chloride Level


 108 mmol/L


()  H 





 


Carbon Dioxide Level


 29 mmol/L


(21-32) 





 


Anion Gap 5 (6-14)  L 


 


Blood Urea Nitrogen


 43 mg/dL


(8-26)  H 





 


Creatinine


 1.2 mg/dL


(0.7-1.3) 





 


Estimated GFR


(Cockcroft-Gault) 58.3  


 





 


BUN/Creatinine Ratio 36 (6-20)  H 


 


Glucose Level


 90 mg/dL


(70-99) 





 


Calcium Level


 8.0 mg/dL


(8.5-10.1)  L 





 


Total Bilirubin


 0.5 mg/dL


(0.2-1.0) 





 


Aspartate Amino Transferase


(AST) 27 U/L (15-37)


 





 


Alanine Aminotransferase (ALT)


 23 U/L (16-63)


 





 


Alkaline Phosphatase


 106 U/L


() 





 


Total Protein


 6.8 g/dL


(6.4-8.2) 





 


Albumin


 3.1 g/dL


(3.4-5.0)  L 





 


Albumin/Globulin Ratio


 0.8 (1.0-1.7)


L 





 


Urine Collection Type  Unknown  


 


Urine Color  Yellow  


 


Urine Clarity  Hazy  


 


Urine pH  8.5  


 


Urine Specific Gravity  1.020  


 


Urine Protein


 


 Trace


(NEG-TRACE)


 


Urine Glucose (UA)


 


 Neg mg/dL


(NEG)


 


Urine Ketones (Stick)


 


 Neg mg/dL


(NEG)


 


Urine Blood  Mod (NEG)  


 


Urine Nitrite  Neg (NEG)  


 


Urine Bilirubin  Neg (NEG)  


 


Urine Urobilinogen Dipstick


 


 0.2 mg/dL (0.2


mg/dL)


 


Urine Leukocyte Esterase  Neg (NEG)  


 


Urine RBC


 


 11-20 /HPF


(0-2)


 


Urine WBC


 


 1-4 /HPF (0-4)





 


Urine Squamous Epithelial


Cells 


 Few /LPF  





 


Urine Amorphous Sediment  Present /HPF  


 


Urine Bacteria


 


 Few /HPF


(0-FEW)


 


Urine Hyaline Casts  Occ /HPF  


 


Urine Mucus  Slight /LPF  











Current Medications:


Meds:





Current Medications








 Medications


  (Trade)  Dose


 Ordered  Sig/Lamberto


 Route


 PRN Reason  Start Time


 Stop Time Status Last Admin


Dose Admin


 


 Divalproex Sodium


  (Depakote


 Sprinkles)  250 mg  0900,1700


 PO


   1/17/20 09:00


    1/17/20 17:00





 


 Sertraline HCl


  (Zoloft)  75 mg  DAILY


 PO


   1/17/20 09:00


    1/17/20 08:08





 


 Lorazepam


  (Ativan Inj)  0.5 mg  DAILY


 IM


   1/17/20 09:00


    1/17/20 09:00











I have reviewed the current psychotropics carefully including drug interactions.

 Risk benefit ratio favors no change other than as noted in my dictated progress

note.





Diagnosis:


Problems:  


(1) Dementia with behavioral disturbance


(2) Anxiety disorder


(3) Dementia, vascular, with delusions


(4) Dementia, vascular, with depression


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


(7) Impulse control disorder











NIKHIL LAUREANO MD                 Jan 17, 2020 20:55
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
## 2020-01-17 NOTE — PN
DATE:  01/15/2020



PSYCHIATRIC PROGRESS NOTE



This late entry on 01/15/2020 covers elements not covered in my initial note.



SUBJECTIVE:  I met with the patient in the evening of 01/15/2020.  Per DERIAN Bailey, the patient slept 7-1/4 hours previous night.  He was restless at night,

trying to rip up the counter over the toilet, trying to pull the toilet out of

the floor.  Gets aggressive, confused and psychotic.  At 9:30 a.m., he was

pleasant in the shower.  At lunchtime, he was combative with staff, twisting

their fingers, took a nap, then did better after that.



REVIEW OF SYSTEMS:  No CV, , pulmonary, eye, ENT system symptoms on review. 

Reliability poor.



MENTAL STATUS EXAM:  Oriented to himself.  Insight, judgment, recent and remote

memory, attention, concentration, fund of knowledge poor consistent with his

diagnosis mentioned in my initial note.



PLAN:  We will check with Dr. Schreiber whether the UTI could have recurred worsening

his behaviors, psychosis, confusion.  Maintain rest of the psychotropics,

unchanged from initial note.





______________________________

MAN DELVIS LAUREANO MD



DR:  HARRY/ashly  JOB#:  [id number] / [id number]

DD:  01/17/2020 11:41  DT:  01/17/2020 16:19
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
## 2020-01-18 VITALS — DIASTOLIC BLOOD PRESSURE: 60 MMHG | SYSTOLIC BLOOD PRESSURE: 128 MMHG

## 2020-01-18 VITALS — DIASTOLIC BLOOD PRESSURE: 98 MMHG | SYSTOLIC BLOOD PRESSURE: 126 MMHG

## 2020-01-18 RX ADMIN — NYSTATIN SCH APP: 100000 POWDER TOPICAL at 08:39

## 2020-01-18 RX ADMIN — ASPIRIN 81 MG SCH MG: 81 TABLET ORAL at 08:26

## 2020-01-18 RX ADMIN — CARBIDOPA AND LEVODOPA SCH TAB: 25; 100 TABLET ORAL at 13:34

## 2020-01-18 RX ADMIN — NAPROXEN SCH MG: 250 TABLET ORAL at 08:26

## 2020-01-18 RX ADMIN — DONEPEZIL HYDROCHLORIDE SCH MG: 23 TABLET, FILM COATED ORAL at 20:23

## 2020-01-18 RX ADMIN — QUETIAPINE FUMARATE SCH MG: 25 TABLET, FILM COATED ORAL at 17:00

## 2020-01-18 RX ADMIN — QUETIAPINE FUMARATE SCH MG: 25 TABLET, FILM COATED ORAL at 13:00

## 2020-01-18 RX ADMIN — QUETIAPINE FUMARATE SCH MG: 25 TABLET, FILM COATED ORAL at 08:29

## 2020-01-18 RX ADMIN — POTASSIUM CHLORIDE SCH MEQ: 1500 TABLET, EXTENDED RELEASE ORAL at 08:27

## 2020-01-18 RX ADMIN — MEMANTINE HYDROCHLORIDE SCH MG: 10 TABLET ORAL at 08:29

## 2020-01-18 RX ADMIN — MAGNESIUM HYDROXIDE SCH MG: 400 SUSPENSION ORAL at 20:23

## 2020-01-18 RX ADMIN — CARBIDOPA AND LEVODOPA SCH TAB: 25; 100 TABLET ORAL at 08:29

## 2020-01-18 RX ADMIN — Medication SCH CAP: at 20:23

## 2020-01-18 RX ADMIN — SERTRALINE HYDROCHLORIDE SCH MG: 50 TABLET ORAL at 08:27

## 2020-01-18 RX ADMIN — ACETAMINOPHEN SCH MG: 160 SOLUTION ORAL at 20:22

## 2020-01-18 RX ADMIN — MELATONIN TAB 3 MG SCH MG: 3 TAB at 20:23

## 2020-01-18 RX ADMIN — CHOLECALCIFEROL CAP 1.25 MG (50000 UNIT) SCH UNIT: 1.25 CAP at 08:26

## 2020-01-18 RX ADMIN — MIRTAZAPINE SCH MG: 7.5 TABLET, FILM COATED ORAL at 20:23

## 2020-01-18 RX ADMIN — NAPROXEN SCH MG: 250 TABLET ORAL at 20:24

## 2020-01-18 RX ADMIN — DIVALPROEX SODIUM SCH MG: 125 CAPSULE, COATED PELLETS ORAL at 08:28

## 2020-01-18 RX ADMIN — DIVALPROEX SODIUM SCH MG: 125 CAPSULE, COATED PELLETS ORAL at 17:00

## 2020-01-18 RX ADMIN — ACETAMINOPHEN SCH MG: 160 SOLUTION ORAL at 08:30

## 2020-01-18 RX ADMIN — TRAZODONE HYDROCHLORIDE PRN MG: 50 TABLET, FILM COATED ORAL at 20:24

## 2020-01-18 RX ADMIN — Medication SCH CAP: at 08:25

## 2020-01-18 RX ADMIN — CARBIDOPA AND LEVODOPA SCH TAB: 25; 100 TABLET ORAL at 20:23

## 2020-01-18 RX ADMIN — MEMANTINE HYDROCHLORIDE SCH MG: 10 TABLET ORAL at 20:23

## 2020-01-18 RX ADMIN — NYSTATIN SCH APP: 100000 POWDER TOPICAL at 20:22

## 2020-01-18 NOTE — PDOC
Exam


Note:


George Note:


Please also refer to the separate dictated note~for this date of service 

dictated separately.~Patient seen individually. Discussed the patient with 

Nursing staff reviewed the chart.~Reviewed interim history and current 

functioning. Reviewed vital signs,~Labs/ Radiology~and current medications noted

below. Continue current treatment with the changes noted in the dictated 

addendum note





Assessment:


Vital Signs/I&O:





                                   Vital Signs








  Date Time  Temp Pulse Resp B/P (MAP) Pulse Ox O2 Delivery O2 Flow Rate FiO2


 


1/18/20 15:25 97.0 85 20 126/98 (107) 95   


 


1/18/20 06:06      Room Air  














                                    I & O   


 


 1/17/20 1/17/20 1/18/20





 15:00 23:00 07:00


 


Intake Total 900 ml 360 ml 240 ml


 


Balance 900 ml 360 ml 240 ml











Current Medications:


I have reviewed the current psychotropics carefully including drug interactions.

 Risk benefit ratio favors no change other than as noted in my dictated progress

note.





Diagnosis:


Problems:  


(1) Dementia with behavioral disturbance


(2) Anxiety disorder


(3) Dementia, vascular, with delusions


(4) Dementia, vascular, with depression


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


(7) Impulse control disorder











NIKHIL LAUREANO MD                 Jan 18, 2020 20:52

## 2020-01-19 VITALS — SYSTOLIC BLOOD PRESSURE: 145 MMHG | DIASTOLIC BLOOD PRESSURE: 72 MMHG

## 2020-01-19 VITALS — SYSTOLIC BLOOD PRESSURE: 118 MMHG | DIASTOLIC BLOOD PRESSURE: 50 MMHG

## 2020-01-19 RX ADMIN — ACETAMINOPHEN SCH MG: 160 SOLUTION ORAL at 09:06

## 2020-01-19 RX ADMIN — Medication SCH CAP: at 20:06

## 2020-01-19 RX ADMIN — DIVALPROEX SODIUM SCH MG: 125 CAPSULE, COATED PELLETS ORAL at 09:06

## 2020-01-19 RX ADMIN — ASPIRIN 81 MG SCH MG: 81 TABLET ORAL at 09:07

## 2020-01-19 RX ADMIN — CARBIDOPA AND LEVODOPA SCH TAB: 25; 100 TABLET ORAL at 09:07

## 2020-01-19 RX ADMIN — MIRTAZAPINE SCH MG: 7.5 TABLET, FILM COATED ORAL at 20:07

## 2020-01-19 RX ADMIN — MELATONIN TAB 3 MG SCH MG: 3 TAB at 20:07

## 2020-01-19 RX ADMIN — QUETIAPINE FUMARATE SCH MG: 25 TABLET, FILM COATED ORAL at 17:50

## 2020-01-19 RX ADMIN — NYSTATIN SCH APP: 100000 POWDER TOPICAL at 20:06

## 2020-01-19 RX ADMIN — NAPROXEN SCH MG: 250 TABLET ORAL at 09:07

## 2020-01-19 RX ADMIN — SERTRALINE HYDROCHLORIDE SCH MG: 50 TABLET ORAL at 09:07

## 2020-01-19 RX ADMIN — CARBIDOPA AND LEVODOPA SCH TAB: 25; 100 TABLET ORAL at 20:08

## 2020-01-19 RX ADMIN — NAPROXEN SCH MG: 250 TABLET ORAL at 20:07

## 2020-01-19 RX ADMIN — QUETIAPINE FUMARATE SCH MG: 25 TABLET, FILM COATED ORAL at 13:00

## 2020-01-19 RX ADMIN — MEMANTINE HYDROCHLORIDE SCH MG: 10 TABLET ORAL at 20:07

## 2020-01-19 RX ADMIN — DONEPEZIL HYDROCHLORIDE SCH MG: 23 TABLET, FILM COATED ORAL at 20:07

## 2020-01-19 RX ADMIN — QUETIAPINE FUMARATE SCH MG: 25 TABLET, FILM COATED ORAL at 09:06

## 2020-01-19 RX ADMIN — TRAZODONE HYDROCHLORIDE PRN MG: 50 TABLET, FILM COATED ORAL at 20:07

## 2020-01-19 RX ADMIN — Medication SCH CAP: at 09:06

## 2020-01-19 RX ADMIN — NYSTATIN SCH APP: 100000 POWDER TOPICAL at 09:00

## 2020-01-19 RX ADMIN — CARBIDOPA AND LEVODOPA SCH TAB: 25; 100 TABLET ORAL at 13:05

## 2020-01-19 RX ADMIN — DIVALPROEX SODIUM SCH MG: 125 CAPSULE, COATED PELLETS ORAL at 17:50

## 2020-01-19 RX ADMIN — MEMANTINE HYDROCHLORIDE SCH MG: 10 TABLET ORAL at 09:07

## 2020-01-19 RX ADMIN — ACETAMINOPHEN SCH MG: 160 SOLUTION ORAL at 20:08

## 2020-01-19 RX ADMIN — MAGNESIUM HYDROXIDE SCH MG: 400 SUSPENSION ORAL at 20:08

## 2020-01-19 RX ADMIN — POTASSIUM CHLORIDE SCH MEQ: 1500 TABLET, EXTENDED RELEASE ORAL at 09:06
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
## 2020-01-19 NOTE — PDOC
Exam


Note:


George Note:


Please also refer to the separate dictated note~for this date of service 

dictated separately.~Patient seen individually. Discussed the patient with 

Nursing staff reviewed the chart.~Reviewed interim history and current 

functioning. Reviewed vital signs,~Labs/ Radiology~and current medications noted

below. Continue current treatment with the changes noted in the dictated 

addendum note





Assessment:


Vital Signs/I&O:





                                   Vital Signs








  Date Time  Temp Pulse Resp B/P (MAP) Pulse Ox O2 Delivery O2 Flow Rate FiO2


 


1/19/20 15:28 98.1 80 18 118/50 (72) 94   


 


1/18/20 06:06      Room Air  














                                    I & O   


 


 1/18/20 1/18/20 1/19/20





 15:00 23:00 07:00


 


Intake Total 360 ml 480 ml 


 


Balance 360 ml 480 ml 











Current Medications:


Meds:





Current Medications








 Medications


  (Trade)  Dose


 Ordered  Sig/Lamberto


 Route


 PRN Reason  Start Time


 Stop Time Status Last Admin


Dose Admin


 


 Carbidopa/Levodopa


  (Sinemet 25/100)  1 tab  QID


 PO


   1/19/20 21:00


    1/19/20 20:08











I have reviewed the current psychotropics carefully including drug interactions.

 Risk benefit ratio favors no change other than as noted in my dictated progress

note.





Diagnosis:


Problems:  


(1) Dementia with behavioral disturbance


(2) Anxiety disorder


(3) Dementia, vascular, with delusions


(4) Dementia, vascular, with depression


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


(7) Impulse control disorder











NIKHIL LAUREANO MD                 Jan 19, 2020 20:54

## 2020-01-19 NOTE — PN
DATE:  01/18/2020



PSYCHIATRIC PROGRESS NOTE



This late entry 01/18/2020 covers elements not covered in my initial note.



SUBJECTIVE:  I met with the patient evening of 01/18/2020.  The patient slept

6-1/2 hours previous night.  He continues to be somewhat labile in his mood

agitated previous evening, received Ativan IM, combative at times.  Zyprexa

seemed to help.  UA with straight catheterization was negative.  Valproic acid

level was subtherapeutic on her previous check, on Depakote 250 mg twice a day. 

Next set of labs and valproic acid level on the 20th.



REVIEW OF SYSTEMS:  No CV, , pulmonary, eye, ENT system symptoms on review. 

Reliability poor.



MENTAL STATUS EXAM:  Oriented to himself.  Insight, judgment, recent and remote

memory, attention, concentration, fund of knowledge poor, consistent with his

diagnosis mentioned in theinitial note.



PLAN:  No change from initial note other than noted above.





______________________________

NIKHIL LAUREANO MD



DR:  HARRY/ashly  JOB#:  [id number] / [id number]

DD:  01/19/2020 19:52  DT:  01/19/2020 23:40

## 2020-01-20 VITALS — SYSTOLIC BLOOD PRESSURE: 132 MMHG | DIASTOLIC BLOOD PRESSURE: 78 MMHG

## 2020-01-20 VITALS — SYSTOLIC BLOOD PRESSURE: 159 MMHG | DIASTOLIC BLOOD PRESSURE: 80 MMHG

## 2020-01-20 LAB
ALBUMIN SERPL-MCNC: 2.6 G/DL (ref 3.4–5)
ALBUMIN/GLOB SERPL: 0.7 {RATIO} (ref 1–1.7)
ALP SERPL-CCNC: 94 U/L (ref 46–116)
ALT SERPL-CCNC: 26 U/L (ref 16–63)
ANION GAP SERPL CALC-SCNC: 5 MMOL/L (ref 6–14)
AST SERPL-CCNC: 30 U/L (ref 15–37)
BASOPHILS # BLD AUTO: 0.1 X10^3/UL (ref 0–0.2)
BASOPHILS NFR BLD: 1 % (ref 0–3)
BILIRUB SERPL-MCNC: 0.3 MG/DL (ref 0.2–1)
BUN/CREAT SERPL: 37 (ref 6–20)
CA-I SERPL ISE-MCNC: 41 MG/DL (ref 8–26)
CALCIUM SERPL-MCNC: 7.9 MG/DL (ref 8.5–10.1)
CHLORIDE SERPL-SCNC: 112 MMOL/L (ref 98–107)
CO2 SERPL-SCNC: 29 MMOL/L (ref 21–32)
CREAT SERPL-MCNC: 1.1 MG/DL (ref 0.7–1.3)
EOSINOPHIL NFR BLD: 0.3 X10^3/UL (ref 0–0.7)
EOSINOPHIL NFR BLD: 5 % (ref 0–3)
ERYTHROCYTE [DISTWIDTH] IN BLOOD BY AUTOMATED COUNT: 14.6 % (ref 11.5–14.5)
GFR SERPLBLD BASED ON 1.73 SQ M-ARVRAT: 64.4 ML/MIN
GLOBULIN SER-MCNC: 3.5 G/DL (ref 2.2–3.8)
GLUCOSE SERPL-MCNC: 81 MG/DL (ref 70–99)
HCT VFR BLD CALC: 38.3 % (ref 39–53)
HGB BLD-MCNC: 12.4 G/DL (ref 13–17.5)
LYMPHOCYTES # BLD: 1.6 X10^3/UL (ref 1–4.8)
LYMPHOCYTES NFR BLD AUTO: 25 % (ref 24–48)
MCH RBC QN AUTO: 30 PG (ref 25–35)
MCHC RBC AUTO-ENTMCNC: 32 G/DL (ref 31–37)
MCV RBC AUTO: 92 FL (ref 79–100)
MONO #: 0.5 X10^3/UL (ref 0–1.1)
MONOCYTES NFR BLD: 7 % (ref 0–9)
NEUT #: 4.1 X10^3UL (ref 1.8–7.7)
NEUTROPHILS NFR BLD AUTO: 62 % (ref 31–73)
PLATELET # BLD AUTO: 233 X10^3/UL (ref 140–400)
POTASSIUM SERPL-SCNC: 4.7 MMOL/L (ref 3.5–5.1)
PROT SERPL-MCNC: 6.1 G/DL (ref 6.4–8.2)
RBC # BLD AUTO: 4.17 X10^6/UL (ref 4.3–5.7)
SODIUM SERPL-SCNC: 146 MMOL/L (ref 136–145)
VAL ACID: 17 MCG/ML (ref 50–100)
WBC # BLD AUTO: 6.6 X10^3/UL (ref 4–11)

## 2020-01-20 RX ADMIN — NYSTATIN SCH APP: 100000 POWDER TOPICAL at 11:35

## 2020-01-20 RX ADMIN — CARBIDOPA AND LEVODOPA SCH TAB: 25; 100 TABLET ORAL at 16:56

## 2020-01-20 RX ADMIN — MAGNESIUM HYDROXIDE SCH MG: 400 SUSPENSION ORAL at 19:51

## 2020-01-20 RX ADMIN — NAPROXEN SCH MG: 250 TABLET ORAL at 11:33

## 2020-01-20 RX ADMIN — CARBIDOPA AND LEVODOPA SCH TAB: 25; 100 TABLET ORAL at 19:51

## 2020-01-20 RX ADMIN — DONEPEZIL HYDROCHLORIDE SCH MG: 23 TABLET, FILM COATED ORAL at 19:51

## 2020-01-20 RX ADMIN — MELATONIN TAB 3 MG SCH MG: 3 TAB at 19:51

## 2020-01-20 RX ADMIN — ACETAMINOPHEN SCH MG: 160 SOLUTION ORAL at 19:51

## 2020-01-20 RX ADMIN — NAPROXEN SCH MG: 250 TABLET ORAL at 19:52

## 2020-01-20 RX ADMIN — QUETIAPINE FUMARATE SCH MG: 25 TABLET, FILM COATED ORAL at 14:51

## 2020-01-20 RX ADMIN — Medication SCH CAP: at 11:34

## 2020-01-20 RX ADMIN — MIRTAZAPINE SCH MG: 7.5 TABLET, FILM COATED ORAL at 19:51

## 2020-01-20 RX ADMIN — ACETAMINOPHEN SCH MG: 160 SOLUTION ORAL at 11:34

## 2020-01-20 RX ADMIN — ASPIRIN 81 MG SCH MG: 81 TABLET ORAL at 11:35

## 2020-01-20 RX ADMIN — DIVALPROEX SODIUM SCH MG: 125 CAPSULE, COATED PELLETS ORAL at 11:33

## 2020-01-20 RX ADMIN — MEMANTINE HYDROCHLORIDE SCH MG: 10 TABLET ORAL at 11:34

## 2020-01-20 RX ADMIN — CARBIDOPA AND LEVODOPA SCH TAB: 25; 100 TABLET ORAL at 11:34

## 2020-01-20 RX ADMIN — NYSTATIN SCH APP: 100000 POWDER TOPICAL at 19:53

## 2020-01-20 RX ADMIN — Medication SCH CAP: at 19:51

## 2020-01-20 RX ADMIN — POTASSIUM CHLORIDE SCH MEQ: 1500 TABLET, EXTENDED RELEASE ORAL at 11:35

## 2020-01-20 RX ADMIN — QUETIAPINE FUMARATE SCH MG: 25 TABLET, FILM COATED ORAL at 16:57

## 2020-01-20 RX ADMIN — SERTRALINE HYDROCHLORIDE SCH MG: 50 TABLET ORAL at 11:35

## 2020-01-20 RX ADMIN — DIVALPROEX SODIUM SCH MG: 125 CAPSULE, COATED PELLETS ORAL at 16:56

## 2020-01-20 RX ADMIN — QUETIAPINE FUMARATE SCH MG: 25 TABLET, FILM COATED ORAL at 11:34

## 2020-01-20 RX ADMIN — MEMANTINE HYDROCHLORIDE SCH MG: 10 TABLET ORAL at 19:52

## 2020-01-20 RX ADMIN — CARBIDOPA AND LEVODOPA SCH TAB: 25; 100 TABLET ORAL at 14:51

## 2020-01-20 NOTE — PDOC
Exam


Note:


George Note:


Please also refer to the separate dictated note~for this date of service 

dictated separately.~Patient seen individually. Discussed the patient with 

Nursing staff reviewed the chart.~Reviewed interim history and current 

functioning. Reviewed vital signs,~Labs/ Radiology~and current medications noted

below. Continue current treatment with the changes noted in the dictated 

addendum note





Assessment:


Vital Signs/I&O:





                                   Vital Signs








  Date Time  Temp Pulse Resp B/P (MAP) Pulse Ox O2 Delivery O2 Flow Rate FiO2


 


1/20/20 15:56 98.0 80 16 159/80 (106) 95   


 


1/18/20 06:06      Room Air  














                                    I & O   


 


 1/19/20 1/19/20 1/20/20





 15:00 23:00 07:00


 


Intake Total 1140 ml 360 ml 240 ml


 


Balance 1140 ml 360 ml 240 ml








Labs:





                                Laboratory Tests








Test


 1/20/20


07:25


 


White Blood Count


 6.6 x10^3/uL


(4.0-11.0)


 


Red Blood Count


 4.17 x10^6/uL


(4.30-5.70)  L


 


Hemoglobin


 12.4 g/dL


(13.0-17.5)  L


 


Hematocrit


 38.3 %


(39.0-53.0)  L


 


Mean Corpuscular Volume


 92 fL ()





 


Mean Corpuscular Hemoglobin 30 pg (25-35)  


 


Mean Corpuscular Hemoglobin


Concent 32 g/dL


(31-37)


 


Red Cell Distribution Width


 14.6 %


(11.5-14.5)  H


 


Platelet Count


 233 x10^3/uL


(140-400)


 


Neutrophils (%) (Auto) 62 % (31-73)  


 


Lymphocytes (%) (Auto) 25 % (24-48)  


 


Monocytes (%) (Auto) 7 % (0-9)  


 


Eosinophils (%) (Auto) 5 % (0-3)  H


 


Basophils (%) (Auto) 1 % (0-3)  


 


Neutrophils # (Auto)


 4.1 x10^3uL


(1.8-7.7)


 


Lymphocytes # (Auto)


 1.6 x10^3/uL


(1.0-4.8)


 


Monocytes # (Auto)


 0.5 x10^3/uL


(0.0-1.1)


 


Eosinophils # (Auto)


 0.3 x10^3/uL


(0.0-0.7)


 


Basophils # (Auto)


 0.1 x10^3/uL


(0.0-0.2)


 


Sodium Level


 146 mmol/L


(136-145)  H


 


Potassium Level


 4.7 mmol/L


(3.5-5.1)


 


Chloride Level


 112 mmol/L


()  H


 


Carbon Dioxide Level


 29 mmol/L


(21-32)


 


Anion Gap 5 (6-14)  L


 


Blood Urea Nitrogen


 41 mg/dL


(8-26)  H


 


Creatinine


 1.1 mg/dL


(0.7-1.3)


 


Estimated GFR


(Cockcroft-Gault) 64.4  





 


BUN/Creatinine Ratio 37 (6-20)  H


 


Glucose Level


 81 mg/dL


(70-99)


 


Calcium Level


 7.9 mg/dL


(8.5-10.1)  L


 


Total Bilirubin


 0.3 mg/dL


(0.2-1.0)


 


Aspartate Amino Transferase


(AST) 30 U/L (15-37)





 


Alanine Aminotransferase (ALT)


 26 U/L (16-63)





 


Alkaline Phosphatase


 94 U/L


()


 


Total Protein


 6.1 g/dL


(6.4-8.2)  L


 


Albumin


 2.6 g/dL


(3.4-5.0)  L


 


Albumin/Globulin Ratio


 0.7 (1.0-1.7)


L


 


Valproic Acid Level


 17 mcg/mL


()  L


 


Valproic Acid Last Dose Date 1/19/20  


 


Valproic Acid Last Dose Time 1700  











Current Medications:


Meds:





Current Medications








 Medications


  (Trade)  Dose


 Ordered  Sig/Lamberto


 Route


 PRN Reason  Start Time


 Stop Time Status Last Admin


Dose Admin


 


 Carbidopa/Levodopa


  (Sinemet 25/100)  1 tab  QID


 PO


   1/19/20 21:00


    1/20/20 19:51











I have reviewed the current psychotropics carefully including drug interactions.

 Risk benefit ratio favors no change other than as noted in my dictated progress

note.





Diagnosis:


Problems:  


(1) Dementia with behavioral disturbance


(2) Anxiety disorder


(3) Dementia, vascular, with delusions


(4) Dementia, vascular, with depression


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


(7) Impulse control disorder











NIKHIL LAUREANO MD                 Jan 20, 2020 20:53

## 2020-01-21 VITALS — DIASTOLIC BLOOD PRESSURE: 57 MMHG | SYSTOLIC BLOOD PRESSURE: 122 MMHG

## 2020-01-21 RX ADMIN — SERTRALINE HYDROCHLORIDE SCH MG: 100 TABLET ORAL at 11:07

## 2020-01-21 RX ADMIN — DONEPEZIL HYDROCHLORIDE SCH MG: 23 TABLET, FILM COATED ORAL at 19:25

## 2020-01-21 RX ADMIN — ACETAMINOPHEN SCH MG: 160 SOLUTION ORAL at 11:07

## 2020-01-21 RX ADMIN — CARBIDOPA AND LEVODOPA SCH TAB: 25; 100 TABLET ORAL at 16:58

## 2020-01-21 RX ADMIN — MEMANTINE HYDROCHLORIDE SCH MG: 10 TABLET ORAL at 19:26

## 2020-01-21 RX ADMIN — DIVALPROEX SODIUM SCH MG: 125 CAPSULE, COATED PELLETS ORAL at 11:09

## 2020-01-21 RX ADMIN — NYSTATIN SCH APP: 100000 POWDER TOPICAL at 19:25

## 2020-01-21 RX ADMIN — Medication SCH CAP: at 11:08

## 2020-01-21 RX ADMIN — QUETIAPINE FUMARATE SCH MG: 25 TABLET, FILM COATED ORAL at 13:00

## 2020-01-21 RX ADMIN — MAGNESIUM HYDROXIDE SCH MG: 400 SUSPENSION ORAL at 19:26

## 2020-01-21 RX ADMIN — NAPROXEN SCH MG: 250 TABLET ORAL at 11:08

## 2020-01-21 RX ADMIN — TRAZODONE HYDROCHLORIDE PRN MG: 50 TABLET, FILM COATED ORAL at 19:26

## 2020-01-21 RX ADMIN — QUETIAPINE FUMARATE SCH MG: 25 TABLET, FILM COATED ORAL at 16:59

## 2020-01-21 RX ADMIN — CARBIDOPA AND LEVODOPA SCH TAB: 25; 100 TABLET ORAL at 19:26

## 2020-01-21 RX ADMIN — MEMANTINE HYDROCHLORIDE SCH MG: 10 TABLET ORAL at 11:07

## 2020-01-21 RX ADMIN — ASPIRIN 81 MG SCH MG: 81 TABLET ORAL at 11:09

## 2020-01-21 RX ADMIN — MIRTAZAPINE SCH MG: 7.5 TABLET, FILM COATED ORAL at 19:26

## 2020-01-21 RX ADMIN — ACETAMINOPHEN SCH MG: 160 SOLUTION ORAL at 19:25

## 2020-01-21 RX ADMIN — POTASSIUM CHLORIDE SCH MEQ: 1500 TABLET, EXTENDED RELEASE ORAL at 11:07

## 2020-01-21 RX ADMIN — QUETIAPINE FUMARATE SCH MG: 25 TABLET, FILM COATED ORAL at 11:08

## 2020-01-21 RX ADMIN — CARBIDOPA AND LEVODOPA SCH TAB: 25; 100 TABLET ORAL at 13:00

## 2020-01-21 RX ADMIN — Medication SCH CAP: at 19:26

## 2020-01-21 RX ADMIN — NAPROXEN SCH MG: 250 TABLET ORAL at 19:26

## 2020-01-21 RX ADMIN — MELATONIN TAB 3 MG SCH MG: 3 TAB at 19:25

## 2020-01-21 RX ADMIN — CARBIDOPA AND LEVODOPA SCH TAB: 25; 100 TABLET ORAL at 11:08

## 2020-01-21 RX ADMIN — NYSTATIN SCH APP: 100000 POWDER TOPICAL at 11:09

## 2020-01-21 RX ADMIN — DIVALPROEX SODIUM SCH MG: 125 CAPSULE, COATED PELLETS ORAL at 16:58

## 2020-01-21 NOTE — PDOC
Exam


Note:


George Note:


Please also refer to the separate dictated note~for this date of service 

dictated separately.~Patient seen individually. Discussed the patient with 

Nursing staff reviewed the chart.~Reviewed interim history and current 

functioning. Reviewed vital signs,~Labs/ Radiology~and current medications noted

below. Continue current treatment with the changes noted in the dictated 

addendum note





Assessment:


Vital Signs/I&O:





                                   Vital Signs








  Date Time  Temp Pulse Resp B/P (MAP) Pulse Ox O2 Delivery O2 Flow Rate FiO2


 


1/21/20 16:16 97.4 58 16 122/57 (78) 98 Room Air  














                                    I & O   


 


 1/20/20 1/20/20 1/21/20





 15:00 23:00 07:00


 


Intake Total 360 ml 840 ml 


 


Balance 360 ml 840 ml 











Current Medications:


Meds:





Current Medications








 Medications


  (Trade)  Dose


 Ordered  Sig/Lamberto


 Route


 PRN Reason  Start Time


 Stop Time Status Last Admin


Dose Admin


 


 Sertraline HCl


  (Zoloft)  100 mg  DAILY


 PO


   1/21/20 09:00


    1/21/20 11:07











I have reviewed the current psychotropics carefully including drug interactions.

 Risk benefit ratio favors no change other than as noted in my dictated progress

note.





Diagnosis:


Problems:  


(1) Dementia with behavioral disturbance


(2) Anxiety disorder


(3) Dementia, vascular, with delusions


(4) Dementia, vascular, with depression


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


(7) Impulse control disorder











NIKHIL LAUREANO MD                 Jan 21, 2020 20:59

## 2020-01-21 NOTE — PN
DATE:  01/20/2020



PSYCHIATRIC PROGRESS NOTE



This late entry of 01/20 covers elements not covered in my initial note.



SUBJECTIVE:  I met with the patient in the evening.  Per DERIAN Morel, the patient

slept 8-1/2 hours the previous night.  He slept in, in the morning.  Later, he

was agitated, combative; had to be in the day room; then did better for a while;

then late again in the evening, he was aggressive, had to be placed in the [facility]

Hallway shortly after I visited with him.



REVIEW OF SYSTEMS:  Hard of hearing.  No CV, , pulmonary, eye system symptoms

on review.  Reliability poor.



MENTAL STATUS EXAMINATION:  Oriented to himself.  Insight, judgment, recent and

remote memory, attention, concentration, fund of knowledge poor; consistent with

his diagnosis mentioned in my initial note.



PLAN:  The patient did not receive IM Ativan on 01/20.  We will increase Zoloft

to 100 mg a day after he has been on 75 for 3 days.  Rest unchanged for now.





______________________________

MAN DELVIS LAUREANO MD



DR:  HARRY/ashly  JOB#:  [id number] / [id number]

DD:  01/21/2020 16:23  DT:  01/21/2020 21:48

## 2020-01-22 VITALS — DIASTOLIC BLOOD PRESSURE: 64 MMHG | SYSTOLIC BLOOD PRESSURE: 143 MMHG

## 2020-01-22 VITALS — DIASTOLIC BLOOD PRESSURE: 76 MMHG | SYSTOLIC BLOOD PRESSURE: 135 MMHG

## 2020-01-22 RX ADMIN — NAPROXEN SCH MG: 250 TABLET ORAL at 14:10

## 2020-01-22 RX ADMIN — QUETIAPINE FUMARATE SCH MG: 25 TABLET, FILM COATED ORAL at 17:00

## 2020-01-22 RX ADMIN — MEMANTINE HYDROCHLORIDE SCH MG: 10 TABLET ORAL at 14:08

## 2020-01-22 RX ADMIN — NAPROXEN SCH MG: 250 TABLET ORAL at 21:33

## 2020-01-22 RX ADMIN — Medication SCH CAP: at 21:34

## 2020-01-22 RX ADMIN — MELATONIN TAB 3 MG SCH MG: 3 TAB at 21:35

## 2020-01-22 RX ADMIN — Medication SCH CAP: at 14:08

## 2020-01-22 RX ADMIN — NYSTATIN SCH APP: 100000 POWDER TOPICAL at 14:08

## 2020-01-22 RX ADMIN — ASPIRIN 81 MG SCH MG: 81 TABLET ORAL at 14:09

## 2020-01-22 RX ADMIN — NYSTATIN SCH APP: 100000 POWDER TOPICAL at 21:00

## 2020-01-22 RX ADMIN — POTASSIUM CHLORIDE SCH MEQ: 1500 TABLET, EXTENDED RELEASE ORAL at 14:09

## 2020-01-22 RX ADMIN — CARBIDOPA AND LEVODOPA SCH TAB: 25; 100 TABLET ORAL at 21:34

## 2020-01-22 RX ADMIN — MAGNESIUM HYDROXIDE SCH MG: 400 SUSPENSION ORAL at 21:00

## 2020-01-22 RX ADMIN — SERTRALINE HYDROCHLORIDE SCH MG: 100 TABLET ORAL at 14:10

## 2020-01-22 RX ADMIN — DIVALPROEX SODIUM SCH MG: 125 CAPSULE, COATED PELLETS ORAL at 14:08

## 2020-01-22 RX ADMIN — CARBIDOPA AND LEVODOPA SCH TAB: 25; 100 TABLET ORAL at 17:00

## 2020-01-22 RX ADMIN — CARBIDOPA AND LEVODOPA SCH TAB: 25; 100 TABLET ORAL at 13:00

## 2020-01-22 RX ADMIN — ACETAMINOPHEN SCH MG: 160 SOLUTION ORAL at 14:08

## 2020-01-22 RX ADMIN — MEMANTINE HYDROCHLORIDE SCH MG: 10 TABLET ORAL at 21:34

## 2020-01-22 RX ADMIN — CARBIDOPA AND LEVODOPA SCH TAB: 25; 100 TABLET ORAL at 14:10

## 2020-01-22 RX ADMIN — ACETAMINOPHEN SCH MG: 160 SOLUTION ORAL at 21:00

## 2020-01-22 RX ADMIN — QUETIAPINE FUMARATE SCH MG: 25 TABLET, FILM COATED ORAL at 14:10

## 2020-01-22 RX ADMIN — QUETIAPINE FUMARATE SCH MG: 25 TABLET, FILM COATED ORAL at 13:00

## 2020-01-22 RX ADMIN — MIRTAZAPINE SCH MG: 7.5 TABLET, FILM COATED ORAL at 21:34

## 2020-01-22 RX ADMIN — DIVALPROEX SODIUM SCH MG: 125 CAPSULE, COATED PELLETS ORAL at 17:00

## 2020-01-22 RX ADMIN — DONEPEZIL HYDROCHLORIDE SCH MG: 23 TABLET, FILM COATED ORAL at 21:35

## 2020-01-22 NOTE — PDOC
Exam


Note:


George Note:


Please also refer to the separate dictated note~for this date of service 

dictated separately.~Patient seen individually. Discussed the patient with 

Nursing staff reviewed the chart.~Reviewed interim history and current 

functioning. Reviewed vital signs,~Labs/ Radiology~and current medications noted

below. Continue current treatment with the changes noted in the dictated 

addendum note





Assessment:


Vital Signs/I&O:





                                   Vital Signs








  Date Time  Temp Pulse Resp B/P (MAP) Pulse Ox O2 Delivery O2 Flow Rate FiO2


 


1/22/20 15:57 97.8 59 20 135/76 (95) 99   


 


1/22/20 05:36      Room Air  














                                    I & O   


 


 1/21/20 1/21/20 1/22/20





 15:00 23:00 07:00


 


Intake Total 600 ml 480 ml 


 


Balance 600 ml 480 ml 











Current Medications:


I have reviewed the current psychotropics carefully including drug interactions.

 Risk benefit ratio favors no change other than as noted in my dictated progress

note.





Diagnosis:


Problems:  


(1) Dementia with behavioral disturbance


(2) Anxiety disorder


(3) Dementia, vascular, with delusions


(4) Dementia, vascular, with depression


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


(7) Impulse control disorder











NIKHIL LAUREANO MD                 Jan 22, 2020 20:53

## 2020-01-22 NOTE — PN
DATE:  01/21/2020



PSYCHIATRIC PROGRESS NOTE



This late entry of 01/21/2020 covers the elements not covered in my initial

note.



SUBJECTIVE:  I met with the patient in the evening of 01/21/2020.  Per DERIAN Elliott, the patient slept 7-3/4 hours previous night.  He slept until 11:00 a.m.,

combative early morning, later was in the day room, did better, had a bowel

movement, then was combative when staff was assisting him, better in the

evening.



REVIEW OF SYSTEMS:  No CV, , pulmonary, eye, ENT system symptoms on review. 

Very hard of hearing.  Reliability poor.



MENTAL STATUS EXAM:  Oriented to himself.  Insight, judgment, recent and remote

memory, attention, concentration, fund of knowledge poor, consistent with his

diagnosis mentioned in my initial note.



PLAN:  No change from initial note.





______________________________

MAN DELVIS LAUREANO MD



DR:  HARRY/ashly  JOB#:  [id number] / [id number]

DD:  01/22/2020 17:24  DT:  01/22/2020 23:32

## 2020-01-23 VITALS — SYSTOLIC BLOOD PRESSURE: 109 MMHG | DIASTOLIC BLOOD PRESSURE: 37 MMHG

## 2020-01-23 VITALS — SYSTOLIC BLOOD PRESSURE: 164 MMHG | DIASTOLIC BLOOD PRESSURE: 71 MMHG

## 2020-01-23 RX ADMIN — NAPROXEN SCH MG: 250 TABLET ORAL at 11:59

## 2020-01-23 RX ADMIN — CARBIDOPA AND LEVODOPA SCH TAB: 25; 100 TABLET ORAL at 12:03

## 2020-01-23 RX ADMIN — MIRTAZAPINE SCH MG: 7.5 TABLET, FILM COATED ORAL at 22:26

## 2020-01-23 RX ADMIN — Medication SCH CAP: at 21:00

## 2020-01-23 RX ADMIN — ACETAMINOPHEN SCH MG: 160 SOLUTION ORAL at 21:00

## 2020-01-23 RX ADMIN — QUETIAPINE FUMARATE SCH MG: 25 TABLET, FILM COATED ORAL at 17:32

## 2020-01-23 RX ADMIN — ACETAMINOPHEN SCH MG: 160 SOLUTION ORAL at 09:00

## 2020-01-23 RX ADMIN — MELATONIN TAB 3 MG SCH MG: 3 TAB at 22:25

## 2020-01-23 RX ADMIN — DONEPEZIL HYDROCHLORIDE SCH MG: 23 TABLET, FILM COATED ORAL at 21:00

## 2020-01-23 RX ADMIN — CARBIDOPA AND LEVODOPA SCH TAB: 25; 100 TABLET ORAL at 11:59

## 2020-01-23 RX ADMIN — SERTRALINE HYDROCHLORIDE SCH MG: 100 TABLET ORAL at 11:56

## 2020-01-23 RX ADMIN — MEMANTINE HYDROCHLORIDE SCH MG: 10 TABLET ORAL at 21:00

## 2020-01-23 RX ADMIN — POTASSIUM CHLORIDE SCH MEQ: 1500 TABLET, EXTENDED RELEASE ORAL at 09:00

## 2020-01-23 RX ADMIN — QUETIAPINE FUMARATE SCH MG: 25 TABLET, FILM COATED ORAL at 11:58

## 2020-01-23 RX ADMIN — NAPROXEN SCH MG: 250 TABLET ORAL at 22:26

## 2020-01-23 RX ADMIN — ASPIRIN 81 MG SCH MG: 81 TABLET ORAL at 11:57

## 2020-01-23 RX ADMIN — CARBIDOPA AND LEVODOPA SCH TAB: 25; 100 TABLET ORAL at 22:25

## 2020-01-23 RX ADMIN — MEMANTINE HYDROCHLORIDE SCH MG: 10 TABLET ORAL at 11:59

## 2020-01-23 RX ADMIN — CARBIDOPA AND LEVODOPA SCH TAB: 25; 100 TABLET ORAL at 17:32

## 2020-01-23 RX ADMIN — MAGNESIUM HYDROXIDE SCH MG: 400 SUSPENSION ORAL at 21:00

## 2020-01-23 RX ADMIN — Medication SCH CAP: at 09:00

## 2020-01-23 RX ADMIN — QUETIAPINE FUMARATE SCH MG: 25 TABLET, FILM COATED ORAL at 12:02

## 2020-01-23 RX ADMIN — NYSTATIN SCH APP: 100000 POWDER TOPICAL at 09:00

## 2020-01-23 RX ADMIN — NYSTATIN SCH APP: 100000 POWDER TOPICAL at 21:00

## 2020-01-23 NOTE — PN
DATE:  01/22/2020



PSYCHIATRIC  PROGRESS NOTE



This late entry 01/22/2020 covers elements not covered in my initial note.



SUBJECTIVE:  I met with the patient evening of 01/22/2020.  Per DERIAN Elliott,

the patient slept 6-1/2 hours previous night.  He slept till about 2 p.m.  He is

compliant with his medications, was somewhat combative intermittently in the

evening, but then calmer after that, then went to the day room, takes his

medications and Boost.  Did not receive IM Ativan on 01/22/2020.



REVIEW OF SYSTEMS:  Hard of hearing.  Impaired ambulation.  No CV,  or

pulmonary system symptoms on review.  Reliability poor.



MENTAL STATUS EXAM:  Oriented to himself.  Insight, judgment, recent and remote

memory, attention, concentration, fund of knowledge poor, consistent with his

diagnosis mentioned in my initial note.



PLAN:  No change from initial note, but increase Zoloft from 100 mg a day to 125

mg a day.  Rest unchanged.





______________________________

MAN DELVIS LAUREANO MD



DR:  HARRY/ashly  JOB#:  [id number] / [id number]

DD:  01/23/2020 16:10  DT:  01/23/2020 21:41

## 2020-01-23 NOTE — PDOC
Exam


Note:


George Note:


Please also refer to the separate dictated note~for this date of service 

dictated separately.~Patient seen individually. Discussed the patient with 

Nursing staff reviewed the chart.~Reviewed interim history and current 

functioning. Reviewed vital signs,~Labs/ Radiology~and current medications noted

below. Continue current treatment with the changes noted in the dictated 

addendum note





Assessment:


Vital Signs/I&O:





                                   Vital Signs








  Date Time  Temp Pulse Resp B/P (MAP) Pulse Ox O2 Delivery O2 Flow Rate FiO2


 


1/23/20 16:16 98.3 72 18 109/37 (61) 98   


 


1/23/20 06:37      Room Air  














                                    I & O   


 


 1/22/20 1/22/20 1/23/20





 15:00 23:00 07:00


 


Intake Total 0 ml 420 ml 


 


Balance 0 ml 420 ml 











Current Medications:


Meds:





Current Medications








 Medications


  (Trade)  Dose


 Ordered  Sig/Lamberto


 Route


 PRN Reason  Start Time


 Stop Time Status Last Admin


Dose Admin


 


 Sertraline HCl


  (Zoloft)  125 mg  DAILY


 PO


   1/23/20 09:00


    1/23/20 11:56











I have reviewed the current psychotropics carefully including drug interactions.

 Risk benefit ratio favors no change other than as noted in my dictated progress

note.





Diagnosis:


Problems:  


(1) Dementia with behavioral disturbance


(2) Anxiety disorder


(3) Dementia, vascular, with delusions


(4) Dementia, vascular, with depression


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


(7) Impulse control disorder











NIKHIL LAUREANO MD                 Jan 23, 2020 21:03

## 2020-01-24 VITALS — DIASTOLIC BLOOD PRESSURE: 37 MMHG | SYSTOLIC BLOOD PRESSURE: 109 MMHG

## 2020-01-24 VITALS — SYSTOLIC BLOOD PRESSURE: 96 MMHG | DIASTOLIC BLOOD PRESSURE: 64 MMHG

## 2020-01-24 RX ADMIN — CARBIDOPA AND LEVODOPA SCH TAB: 25; 100 TABLET ORAL at 17:00

## 2020-01-24 RX ADMIN — QUETIAPINE FUMARATE SCH MG: 25 TABLET, FILM COATED ORAL at 13:00

## 2020-01-24 RX ADMIN — CARBIDOPA AND LEVODOPA SCH TAB: 25; 100 TABLET ORAL at 13:00

## 2020-01-24 RX ADMIN — SERTRALINE HYDROCHLORIDE SCH MG: 100 TABLET ORAL at 09:00

## 2020-01-24 RX ADMIN — TRAZODONE HYDROCHLORIDE PRN MG: 50 TABLET, FILM COATED ORAL at 20:18

## 2020-01-24 RX ADMIN — QUETIAPINE FUMARATE SCH MG: 25 TABLET, FILM COATED ORAL at 17:00

## 2020-01-24 RX ADMIN — MEMANTINE HYDROCHLORIDE SCH MG: 10 TABLET ORAL at 20:18

## 2020-01-24 RX ADMIN — CARBIDOPA AND LEVODOPA SCH TAB: 25; 100 TABLET ORAL at 09:00

## 2020-01-24 RX ADMIN — Medication SCH CAP: at 20:18

## 2020-01-24 RX ADMIN — MEMANTINE HYDROCHLORIDE SCH MG: 10 TABLET ORAL at 09:00

## 2020-01-24 RX ADMIN — MELATONIN TAB 3 MG SCH MG: 3 TAB at 20:18

## 2020-01-24 RX ADMIN — NAPROXEN SCH MG: 250 TABLET ORAL at 09:00

## 2020-01-24 RX ADMIN — ASPIRIN 81 MG SCH MG: 81 TABLET ORAL at 09:00

## 2020-01-24 RX ADMIN — Medication SCH CAP: at 09:00

## 2020-01-24 RX ADMIN — DONEPEZIL HYDROCHLORIDE SCH MG: 23 TABLET, FILM COATED ORAL at 20:18

## 2020-01-24 RX ADMIN — MAGNESIUM HYDROXIDE SCH MG: 400 SUSPENSION ORAL at 20:18

## 2020-01-24 RX ADMIN — DIVALPROEX SODIUM SCH MG: 125 CAPSULE, COATED PELLETS ORAL at 17:00

## 2020-01-24 RX ADMIN — ACETAMINOPHEN SCH MG: 160 SOLUTION ORAL at 20:18

## 2020-01-24 RX ADMIN — NAPROXEN SCH MG: 250 TABLET ORAL at 20:18

## 2020-01-24 RX ADMIN — CARBIDOPA AND LEVODOPA SCH TAB: 25; 100 TABLET ORAL at 20:18

## 2020-01-24 RX ADMIN — MIRTAZAPINE SCH MG: 7.5 TABLET, FILM COATED ORAL at 20:18

## 2020-01-24 RX ADMIN — POTASSIUM CHLORIDE SCH MEQ: 1500 TABLET, EXTENDED RELEASE ORAL at 09:00

## 2020-01-24 RX ADMIN — QUETIAPINE FUMARATE SCH MG: 25 TABLET, FILM COATED ORAL at 09:00

## 2020-01-24 RX ADMIN — NYSTATIN SCH APP: 100000 POWDER TOPICAL at 09:00

## 2020-01-24 RX ADMIN — ACETAMINOPHEN SCH MG: 160 SOLUTION ORAL at 09:00

## 2020-01-24 RX ADMIN — NYSTATIN SCH APP: 100000 POWDER TOPICAL at 20:17

## 2020-01-24 RX ADMIN — DIVALPROEX SODIUM SCH MG: 125 CAPSULE, COATED PELLETS ORAL at 09:00

## 2020-01-24 NOTE — PDOC
Exam


Note:


George Note:


Please also refer to the separate dictated note~for this date of service 

dictated separately.~Patient seen individually. Discussed the patient with 

Nursing staff reviewed the chart.~Reviewed interim history and current 

functioning. Reviewed vital signs,~Labs/ Radiology~and current medications noted

below. Continue current treatment with the changes noted in the dictated 

addendum note





Assessment:


Vital Signs/I&O:





                                   Vital Signs








  Date Time  Temp Pulse Resp B/P (MAP) Pulse Ox O2 Delivery O2 Flow Rate FiO2


 


1/24/20 15:59 97.3 67 18 96/64 (75) 96   


 


1/23/20 06:37      Room Air  














                                    I & O   


 


 1/23/20 1/23/20 1/24/20





 15:00 23:00 07:00


 


Intake Total 0 ml 100 ml 240 ml


 


Balance 0 ml 100 ml 240 ml











Current Medications:


Meds:





Current Medications








 Medications


  (Trade)  Dose


 Ordered  Sig/Lamberto


 Route


 PRN Reason  Start Time


 Stop Time Status Last Admin


Dose Admin


 


 Divalproex Sodium


  (Depakote


 Sprinkles)  375 mg  0900,1700


 PO


   1/24/20 09:00


    1/24/20 09:00











I have reviewed the current psychotropics carefully including drug interactions.

 Risk benefit ratio favors no change other than as noted in my dictated progress

note.





Diagnosis:


Problems:  


(1) Dementia with behavioral disturbance


(2) Anxiety disorder


(3) Dementia, vascular, with delusions


(4) Dementia, vascular, with depression


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


(7) Impulse control disorder











NIKHIL LAUREANO MD                 Jan 24, 2020 21:03

## 2020-01-25 VITALS — DIASTOLIC BLOOD PRESSURE: 58 MMHG | SYSTOLIC BLOOD PRESSURE: 138 MMHG

## 2020-01-25 VITALS — DIASTOLIC BLOOD PRESSURE: 49 MMHG | SYSTOLIC BLOOD PRESSURE: 114 MMHG

## 2020-01-25 RX ADMIN — CARBIDOPA AND LEVODOPA SCH TAB: 25; 100 TABLET ORAL at 13:01

## 2020-01-25 RX ADMIN — CARBIDOPA AND LEVODOPA SCH TAB: 25; 100 TABLET ORAL at 17:00

## 2020-01-25 RX ADMIN — MEMANTINE HYDROCHLORIDE SCH MG: 10 TABLET ORAL at 13:00

## 2020-01-25 RX ADMIN — QUETIAPINE FUMARATE SCH MG: 25 TABLET, FILM COATED ORAL at 17:00

## 2020-01-25 RX ADMIN — SERTRALINE HYDROCHLORIDE SCH MG: 100 TABLET ORAL at 13:03

## 2020-01-25 RX ADMIN — ACETAMINOPHEN SCH MG: 160 SOLUTION ORAL at 20:41

## 2020-01-25 RX ADMIN — Medication SCH CAP: at 13:02

## 2020-01-25 RX ADMIN — ACETAMINOPHEN SCH MG: 160 SOLUTION ORAL at 13:03

## 2020-01-25 RX ADMIN — NYSTATIN SCH APP: 100000 POWDER TOPICAL at 20:42

## 2020-01-25 RX ADMIN — MEMANTINE HYDROCHLORIDE SCH MG: 10 TABLET ORAL at 20:41

## 2020-01-25 RX ADMIN — QUETIAPINE FUMARATE SCH MG: 25 TABLET, FILM COATED ORAL at 13:03

## 2020-01-25 RX ADMIN — CARBIDOPA AND LEVODOPA SCH TAB: 25; 100 TABLET ORAL at 19:55

## 2020-01-25 RX ADMIN — NAPROXEN SCH MG: 250 TABLET ORAL at 13:01

## 2020-01-25 RX ADMIN — ASPIRIN 81 MG SCH MG: 81 TABLET ORAL at 13:01

## 2020-01-25 RX ADMIN — NAPROXEN SCH MG: 250 TABLET ORAL at 19:55

## 2020-01-25 RX ADMIN — Medication SCH CAP: at 20:41

## 2020-01-25 RX ADMIN — DONEPEZIL HYDROCHLORIDE SCH MG: 23 TABLET, FILM COATED ORAL at 20:40

## 2020-01-25 RX ADMIN — CHOLECALCIFEROL CAP 1.25 MG (50000 UNIT) SCH UNIT: 1.25 CAP at 13:00

## 2020-01-25 RX ADMIN — QUETIAPINE FUMARATE SCH MG: 25 TABLET, FILM COATED ORAL at 13:01

## 2020-01-25 RX ADMIN — MAGNESIUM HYDROXIDE SCH MG: 400 SUSPENSION ORAL at 20:41

## 2020-01-25 RX ADMIN — DIVALPROEX SODIUM SCH MG: 125 CAPSULE, COATED PELLETS ORAL at 17:00

## 2020-01-25 RX ADMIN — MELATONIN TAB 3 MG SCH MG: 3 TAB at 19:55

## 2020-01-25 RX ADMIN — TRAZODONE HYDROCHLORIDE PRN MG: 50 TABLET, FILM COATED ORAL at 19:55

## 2020-01-25 RX ADMIN — POTASSIUM CHLORIDE SCH MEQ: 1500 TABLET, EXTENDED RELEASE ORAL at 13:02

## 2020-01-25 RX ADMIN — CARBIDOPA AND LEVODOPA SCH TAB: 25; 100 TABLET ORAL at 13:00

## 2020-01-25 RX ADMIN — DIVALPROEX SODIUM SCH MG: 125 CAPSULE, COATED PELLETS ORAL at 13:01

## 2020-01-25 RX ADMIN — NYSTATIN SCH APP: 100000 POWDER TOPICAL at 09:00

## 2020-01-25 RX ADMIN — MIRTAZAPINE SCH MG: 7.5 TABLET, FILM COATED ORAL at 19:55

## 2020-01-25 NOTE — PN
DATE:  01/11/2020



SUBJECTIVE:  The patient is unable to provide any information.  He is demented. 

He does not answer any questions.  It was reported that the patient has been

agitated and bringing himself to the floor.  He does have intermittent resting

tremor of the hands, more prominent on the left side along with stiffness.



OBJECTIVE:

GENERAL:  Well-developed, well-nourished male, not in acute distress.

VITAL SIGNS:  Blood pressure 111/68, respiratory rate 18, pulse is 80 and

regular, oxygen saturation 93% and temperature 97.6.

HEENT:  Normocephalic, atraumatic, otherwise unremarkable.

NECK:  Supple.  Negative for carotid bruit, lymphadenopathy or thyromegaly.

LUNGS:  Clear to A and P.

CARDIOVASCULAR:  Regular rate and rhythm, normal S1, S2.

ABDOMEN:  Soft.  Bowel sounds positive.

EXTREMITIES:  Negative for cyanosis, clubbing or edema.

NEUROLOGICAL EXAM:  Mental Status:  The patient is awake, but does not follow

any commands.  He does not answer any questions at this time.  Further

evaluation is limited at this time.  Cranial nerves are grossly intact, except

for bilateral hearing loss.  Motor examination, resting tremor of both hands,

more distally than proximally.  The tone is slightly increased in the lower

extremities.  Sensory examination revealed normal pinprick and light touch

senses throughout.  Deep tendon reflexes were symmetric and hypoactive with

absent Achilles responses.  Gait not tested.



IMPRESSION:

1.  Resting tremor with rigidity of the lower extremities difficulty to walk. 

These symptoms are consistent with parkinsonian tremor and possible Parkinson

disease.

2.  Dementia of possible Alzheimer type versus vascular type.

3.  Multiple psychiatric problems include behavior disturbances anxiety and

possible severe depression.

4.  Multiple medical problems include chronic obstructive pulmonary disease,

hypertension, osteoarthritis, history of atrial fibrillations and bilateral

deafness.



RECOMMENDATIONS:

1.  We will continue with current management for Parkinson disease including

carbidopa/levodopa 25/100 three times a day.

2.  Continue with current medical and psychiatric care.





______________________________

M DELLA ALEXANDER MD



DR:  HAYLEY/ashly  JOB#:  [id number] / [id number]

DD:  01/25/2020 15:53  DT:  01/25/2020 20:38

## 2020-01-25 NOTE — PDOC
Exam


Note:


George Note:


Please also refer to the separate dictated note~for this date of service 

dictated separately.~Patient seen individually. Discussed the patient with 

Nursing staff reviewed the chart.~Reviewed interim history and current 

functioning. Reviewed vital signs,~Labs/ Radiology~and current medications noted

below. Continue current treatment with the changes noted in the dictated 

addendum note





Assessment:


Vital Signs/I&O:





                                   Vital Signs








  Date Time  Temp Pulse Resp B/P (MAP) Pulse Ox O2 Delivery O2 Flow Rate FiO2


 


1/25/20 16:02 97.8 69 18 138/58 (84) 96   


 


1/23/20 06:37      Room Air  














                                    I & O   


 


 1/24/20 1/24/20 1/25/20





 15:00 23:00 07:00


 


Intake Total 360 ml 240 ml 


 


Balance 360 ml 240 ml 











Current Medications:


I have reviewed the current psychotropics carefully including drug interactions.

 Risk benefit ratio favors no change other than as noted in my dictated progress

note.





Diagnosis:


Problems:  


(1) Dementia with behavioral disturbance


(2) Anxiety disorder


(3) Dementia, vascular, with delusions


(4) Dementia, vascular, with depression


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


(7) Impulse control disorder











NIKHIL LAUREANO MD                 Jan 25, 2020 22:35

## 2020-01-25 NOTE — PN
DATE:  01/13/2020



SUBJECTIVE:  The patient has been somewhat agitated in the morning, but he is

currently calm and cooperative.  He is hard of hearing and does not respond well

to command.  No recent falls or injuries.  He continued to have resting tremor

of the upper extremities, more prominent on the left side.



OBJECTIVE:

GENERAL:  Well-developed, well-nourished male, not in acute distress.

VITAL SIGNS:  Blood pressure 130/82, respiratory rate is 17, pulse is 67 and

regular, temperature 97.6, oxygen saturation 94% on room air.

HEENT:  Normocephalic, atraumatic, otherwise unremarkable.

NECK:  Supple.  Negative for carotid bruit, lymphadenopathy or thyromegaly.

LUNGS:  Diminished breath sounds bilaterally.  No wheezing or rales.

CARDIOVASCULAR:  Regular rate and rhythm.  Normal S1, S2.

ABDOMEN:  Soft.  Bowel sounds positive.

EXTREMITIES:  Negative for cyanosis, clubbing or edema.

NEUROLOGICAL EXAM:  Mental Status:  The patient is awake and he follows 1-step

commands.  He is hard of hearing.  Speech is limited.  Further evaluation is

limited at this time.  Cranial nerves are grossly intact, except for bilateral

hearing loss.  Motor examination:  No focal muscle bulk was seen.  The strength

is 4/5 throughout.  The patient had a mild resting tremor of the upper

extremities.  The tone is increased in the lower extremities.  Sensory

examination revealed normal pinprick and light touch senses throughout.  Deep

tendon reflexes were symmetric and hypoactive with absent Achilles responses. 

Gait not tested at this time.  The patient ambulates with a chair.



LABORATORY DATA:  On 01/12/2020, urine culture is positive and showed

Enterococcus faecalis greater than 100,000 colonies.



IMPRESSION:

1.  Parkinsonian tremor, presented with resting tremor, increased tone in the

lower extremities with difficulty walking.

2.  Multiple medical problems include hypertension, chronic obstructive

pulmonary disease, history of paroxysmal atrial fibrillation, osteoarthritis and

bilateral deafness.

3.  Multiple psychiatric problems include behavior disturbances, anxiety,

intermittent delusions, and depression.

4.  Dementia.

5.  Urinary tract infection.





RECOMMENDATIONS:

1.  We will continue with carbidopa/levodopa at 25/100 t.i.d.

3.  Continue with current medical and psychiatric care.





______________________________

M DELLA ALEXANDER MD



DR:  HAYLEY/ashly  JOB#:  [id number] / [id number]

DD:  01/25/2020 16:06  DT:  01/25/2020 20:48

## 2020-01-26 VITALS — SYSTOLIC BLOOD PRESSURE: 113 MMHG | DIASTOLIC BLOOD PRESSURE: 82 MMHG

## 2020-01-26 VITALS — DIASTOLIC BLOOD PRESSURE: 54 MMHG | SYSTOLIC BLOOD PRESSURE: 135 MMHG

## 2020-01-26 RX ADMIN — CARBIDOPA AND LEVODOPA SCH TAB: 25; 100 TABLET ORAL at 17:00

## 2020-01-26 RX ADMIN — DIVALPROEX SODIUM SCH MG: 125 CAPSULE, COATED PELLETS ORAL at 09:00

## 2020-01-26 RX ADMIN — DIVALPROEX SODIUM SCH MG: 125 CAPSULE, COATED PELLETS ORAL at 13:29

## 2020-01-26 RX ADMIN — ASPIRIN 81 MG SCH MG: 81 TABLET ORAL at 09:00

## 2020-01-26 RX ADMIN — ACETAMINOPHEN SCH MG: 160 SOLUTION ORAL at 21:00

## 2020-01-26 RX ADMIN — DONEPEZIL HYDROCHLORIDE SCH MG: 23 TABLET, FILM COATED ORAL at 21:00

## 2020-01-26 RX ADMIN — NYSTATIN SCH APP: 100000 POWDER TOPICAL at 21:00

## 2020-01-26 RX ADMIN — MEMANTINE HYDROCHLORIDE SCH MG: 10 TABLET ORAL at 09:00

## 2020-01-26 RX ADMIN — MEMANTINE HYDROCHLORIDE SCH MG: 10 TABLET ORAL at 21:00

## 2020-01-26 RX ADMIN — NAPROXEN SCH MG: 250 TABLET ORAL at 20:10

## 2020-01-26 RX ADMIN — CARBIDOPA AND LEVODOPA SCH TAB: 25; 100 TABLET ORAL at 20:10

## 2020-01-26 RX ADMIN — Medication SCH CAP: at 09:00

## 2020-01-26 RX ADMIN — MAGNESIUM HYDROXIDE SCH MG: 400 SUSPENSION ORAL at 21:00

## 2020-01-26 RX ADMIN — CARBIDOPA AND LEVODOPA SCH TAB: 25; 100 TABLET ORAL at 09:00

## 2020-01-26 RX ADMIN — TRAZODONE HYDROCHLORIDE PRN MG: 50 TABLET, FILM COATED ORAL at 20:10

## 2020-01-26 RX ADMIN — ACETAMINOPHEN SCH MG: 160 SOLUTION ORAL at 09:00

## 2020-01-26 RX ADMIN — QUETIAPINE FUMARATE SCH MG: 25 TABLET, FILM COATED ORAL at 09:00

## 2020-01-26 RX ADMIN — SERTRALINE HYDROCHLORIDE SCH MG: 100 TABLET ORAL at 09:00

## 2020-01-26 RX ADMIN — CARBIDOPA AND LEVODOPA SCH TAB: 25; 100 TABLET ORAL at 13:00

## 2020-01-26 RX ADMIN — MELATONIN TAB 3 MG SCH MG: 3 TAB at 20:10

## 2020-01-26 RX ADMIN — NAPROXEN SCH MG: 250 TABLET ORAL at 09:00

## 2020-01-26 RX ADMIN — Medication SCH CAP: at 21:00

## 2020-01-26 RX ADMIN — POTASSIUM CHLORIDE SCH MEQ: 1500 TABLET, EXTENDED RELEASE ORAL at 09:00

## 2020-01-26 RX ADMIN — MIRTAZAPINE SCH MG: 7.5 TABLET, FILM COATED ORAL at 21:00

## 2020-01-26 RX ADMIN — NYSTATIN SCH APP: 100000 POWDER TOPICAL at 09:00

## 2020-01-26 RX ADMIN — QUETIAPINE FUMARATE SCH MG: 25 TABLET, FILM COATED ORAL at 17:00

## 2020-01-26 RX ADMIN — QUETIAPINE FUMARATE SCH MG: 25 TABLET, FILM COATED ORAL at 13:00

## 2020-01-26 NOTE — PN
DATE:  01/23/2020



PSYCHIATRIC  PROGRESS NOTE



This late entry 01/23/2020 covers elements not covered in my initial note.



SUBJECTIVE:  I met with the patient in the evening and staffed at a treatment

team meeting with the entire team in the morning.  The patient is sleeping

average 7 hours, appetite 75%.  He remains confused.  Poor vision and being hard

of hearing further complicates the confusion and his response to the

environmental stimuli around him.  We are repeating a UA to make sure UTI does

not persist.  He continues to be intermittently agitated, disruptive.



REVIEW OF SYSTEMS:  No CV, , GI system symptoms on review other than poor

vision.  Hard of hearing.  Impaired ambulation.



MENTAL STATUS EXAM:  Oriented to himself.  Insight, judgment, recent and remote

memory, attention, concentration, fund of knowledge poor, consistent with his

diagnosis mentioned in my initial note.



IMPRESSION:  Major neurocognitive disorder, Alzheimer, vascular with delusion,

depression, behavioral disturbance; anxiety disorder, unspecified; impulse

control disorder, unspecified.  Rest unchanged.



PLAN:  Increase Depakote from 250 b.i.d. to 375 mg b.i.d.  Check CBC, CMP,

valproic acid level in 3 days.  Valproic acid level at last check on 01/20/2020

was 17, subtherapeutic on 250 b.i.d. and therefore we are increasing it. 

Continue Sinemet for his Parkinson's, Aricept, melatonin, Remeron, Namenda,

trazodone, Zoloft, Seroquel as noted in my initial note and he gets Ativan IM

scheduled daily since oral was ineffective, but we may stop this once

behaviorally he is more stable.





______________________________

MAN DELVIS LAUREANO MD



DR:  HARRY/ashly  JOB#:  [id number] / [id number]

DD:  01/26/2020 11:51  DT:  01/26/2020 18:34

## 2020-01-26 NOTE — PDOC
Exam


Note:


George Note:


Please also refer to the separate dictated note~for this date of service 

dictated separately.~Patient seen individually. Discussed the patient with 

Nursing staff reviewed the chart.~Reviewed interim history and current 

functioning. Reviewed vital signs,~Labs/ Radiology~and current medications noted

below. Continue current treatment with the changes noted in the dictated 

addendum note





Assessment:


Vital Signs/I&O:





                                   Vital Signs








  Date Time  Temp Pulse Resp B/P (MAP) Pulse Ox O2 Delivery O2 Flow Rate FiO2


 


1/26/20 16:13 97.4 60 20 113/82 (92) 99   


 


1/23/20 06:37      Room Air  














                                    I & O   


 


 1/25/20 1/25/20 1/26/20





 15:00 23:00 07:00


 


Intake Total 360 ml 960 ml 


 


Balance 360 ml 960 ml 











Current Medications:


I have reviewed the current psychotropics carefully including drug interactions.

 Risk benefit ratio favors no change other than as noted in my dictated progress

note.





Diagnosis:


Problems:  


(1) Dementia with behavioral disturbance


(2) Anxiety disorder


(3) Dementia, vascular, with delusions


(4) Dementia, vascular, with depression


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


(7) Impulse control disorder











NIKHIL LAUREANO MD                 Jan 26, 2020 20:52

## 2020-01-27 VITALS — DIASTOLIC BLOOD PRESSURE: 50 MMHG | SYSTOLIC BLOOD PRESSURE: 127 MMHG

## 2020-01-27 VITALS — DIASTOLIC BLOOD PRESSURE: 45 MMHG | SYSTOLIC BLOOD PRESSURE: 96 MMHG

## 2020-01-27 LAB
ALBUMIN SERPL-MCNC: 3 G/DL (ref 3.4–5)
ALBUMIN/GLOB SERPL: 0.8 {RATIO} (ref 1–1.7)
ALP SERPL-CCNC: 120 U/L (ref 46–116)
ALT SERPL-CCNC: 22 U/L (ref 16–63)
ANION GAP SERPL CALC-SCNC: 5 MMOL/L (ref 6–14)
AST SERPL-CCNC: 30 U/L (ref 15–37)
BASOPHILS # BLD AUTO: 0.1 X10^3/UL (ref 0–0.2)
BASOPHILS NFR BLD: 1 % (ref 0–3)
BILIRUB SERPL-MCNC: 0.3 MG/DL (ref 0.2–1)
BUN/CREAT SERPL: 47 (ref 6–20)
CA-I SERPL ISE-MCNC: 52 MG/DL (ref 8–26)
CALCIUM SERPL-MCNC: 8.5 MG/DL (ref 8.5–10.1)
CHLORIDE SERPL-SCNC: 115 MMOL/L (ref 98–107)
CO2 SERPL-SCNC: 31 MMOL/L (ref 21–32)
CREAT SERPL-MCNC: 1.1 MG/DL (ref 0.7–1.3)
EOSINOPHIL NFR BLD: 0.3 X10^3/UL (ref 0–0.7)
EOSINOPHIL NFR BLD: 3 % (ref 0–3)
ERYTHROCYTE [DISTWIDTH] IN BLOOD BY AUTOMATED COUNT: 14.9 % (ref 11.5–14.5)
GFR SERPLBLD BASED ON 1.73 SQ M-ARVRAT: 64.4 ML/MIN
GLOBULIN SER-MCNC: 3.8 G/DL (ref 2.2–3.8)
GLUCOSE SERPL-MCNC: 85 MG/DL (ref 70–99)
HCT VFR BLD CALC: 40.4 % (ref 39–53)
HGB BLD-MCNC: 12.9 G/DL (ref 13–17.5)
LYMPHOCYTES # BLD: 1.6 X10^3/UL (ref 1–4.8)
LYMPHOCYTES NFR BLD AUTO: 17 % (ref 24–48)
MCH RBC QN AUTO: 30 PG (ref 25–35)
MCHC RBC AUTO-ENTMCNC: 32 G/DL (ref 31–37)
MCV RBC AUTO: 93 FL (ref 79–100)
MONO #: 0.8 X10^3/UL (ref 0–1.1)
MONOCYTES NFR BLD: 8 % (ref 0–9)
NEUT #: 6.9 X10^3UL (ref 1.8–7.7)
NEUTROPHILS NFR BLD AUTO: 72 % (ref 31–73)
PLATELET # BLD AUTO: 252 X10^3/UL (ref 140–400)
POTASSIUM SERPL-SCNC: 4.4 MMOL/L (ref 3.5–5.1)
PROT SERPL-MCNC: 6.8 G/DL (ref 6.4–8.2)
RBC # BLD AUTO: 4.34 X10^6/UL (ref 4.3–5.7)
SODIUM SERPL-SCNC: 151 MMOL/L (ref 136–145)
VAL ACID: 6 MCG/ML (ref 50–100)
WBC # BLD AUTO: 9.6 X10^3/UL (ref 4–11)

## 2020-01-27 RX ADMIN — DIVALPROEX SODIUM SCH MG: 125 CAPSULE, COATED PELLETS ORAL at 17:11

## 2020-01-27 RX ADMIN — CARBIDOPA AND LEVODOPA SCH TAB: 25; 100 TABLET ORAL at 12:23

## 2020-01-27 RX ADMIN — CARBIDOPA AND LEVODOPA SCH TAB: 25; 100 TABLET ORAL at 17:11

## 2020-01-27 RX ADMIN — SERTRALINE HYDROCHLORIDE SCH MG: 100 TABLET ORAL at 08:44

## 2020-01-27 RX ADMIN — MEMANTINE HYDROCHLORIDE SCH MG: 10 TABLET ORAL at 08:43

## 2020-01-27 RX ADMIN — CARBIDOPA AND LEVODOPA SCH TAB: 25; 100 TABLET ORAL at 21:00

## 2020-01-27 RX ADMIN — NYSTATIN SCH APP: 100000 POWDER TOPICAL at 08:42

## 2020-01-27 RX ADMIN — NAPROXEN SCH MG: 250 TABLET ORAL at 21:00

## 2020-01-27 RX ADMIN — CARBIDOPA AND LEVODOPA SCH TAB: 25; 100 TABLET ORAL at 08:43

## 2020-01-27 RX ADMIN — POTASSIUM CHLORIDE SCH MEQ: 1500 TABLET, EXTENDED RELEASE ORAL at 08:43

## 2020-01-27 RX ADMIN — MEMANTINE HYDROCHLORIDE SCH MG: 10 TABLET ORAL at 21:00

## 2020-01-27 RX ADMIN — ASPIRIN 81 MG SCH MG: 81 TABLET ORAL at 08:43

## 2020-01-27 RX ADMIN — ACETAMINOPHEN SCH MG: 160 SOLUTION ORAL at 08:42

## 2020-01-27 RX ADMIN — MIRTAZAPINE SCH MG: 7.5 TABLET, FILM COATED ORAL at 21:00

## 2020-01-27 RX ADMIN — QUETIAPINE FUMARATE SCH MG: 25 TABLET, FILM COATED ORAL at 12:23

## 2020-01-27 RX ADMIN — MELATONIN TAB 3 MG SCH MG: 3 TAB at 21:00

## 2020-01-27 RX ADMIN — DONEPEZIL HYDROCHLORIDE SCH MG: 23 TABLET, FILM COATED ORAL at 21:00

## 2020-01-27 RX ADMIN — NYSTATIN SCH APP: 100000 POWDER TOPICAL at 21:00

## 2020-01-27 RX ADMIN — QUETIAPINE FUMARATE SCH MG: 25 TABLET, FILM COATED ORAL at 17:11

## 2020-01-27 RX ADMIN — Medication SCH CAP: at 08:43

## 2020-01-27 RX ADMIN — Medication SCH CAP: at 21:00

## 2020-01-27 RX ADMIN — MAGNESIUM HYDROXIDE SCH MG: 400 SUSPENSION ORAL at 21:00

## 2020-01-27 RX ADMIN — ACETAMINOPHEN SCH MG: 160 SOLUTION ORAL at 21:00

## 2020-01-27 RX ADMIN — NAPROXEN SCH MG: 250 TABLET ORAL at 08:43

## 2020-01-27 RX ADMIN — DIVALPROEX SODIUM SCH MG: 125 CAPSULE, COATED PELLETS ORAL at 08:43

## 2020-01-27 RX ADMIN — QUETIAPINE FUMARATE SCH MG: 25 TABLET, FILM COATED ORAL at 08:44

## 2020-01-27 NOTE — PN
DATE:  01/24/2020



PSYCHIATRIC PROGRESS NOTE



This late entry 01/24/2020 covers elements not covered in my initial note.



SUBJECTIVE:  I met with the patient evening of 01/24/2020.  Per DERIAN Wray, the

patient slept till lunchtime and had previously slept 6-1/2 hours at night. 

Then, he was quite cooperative, agitated in the afternoon, received IM Ativan

scheduled.



REVIEW OF SYSTEMS:  Poor vision.  Hard of hearing.  No CV, , GI, or pulmonary

system symptoms on review.  Reliability poor.



MENTAL STATUS EXAM:  Oriented to himself.  Insight, judgment, recent and remote

memory, attention, concentration, fund of knowledge poor, consistent with his

diagnosis mentioned in my initial note.



PLAN:  No change from initial note.





______________________________

MAN DELVIS LAUREANO MD



DR:  HARRY/ashly  JOB#:  [id number] / [id number]

DD:  01/26/2020 17:40  DT:  01/27/2020 00:22

## 2020-01-27 NOTE — PDOC
Exam


Note:


George Note:


Please also refer to the separate dictated note~for this date of service 

dictated separately.~Patient seen individually. Discussed the patient with 

Nursing staff reviewed the chart.~Reviewed interim history and current 

functioning. Reviewed vital signs,~Labs/ Radiology~and current medications noted

below. Continue current treatment with the changes noted in the dictated 

addendum note





Assessment:


Vital Signs/I&O:





                                   Vital Signs








  Date Time  Temp Pulse Resp B/P (MAP) Pulse Ox O2 Delivery O2 Flow Rate FiO2


 


1/27/20 15:17 98.3 87 18 127/50 (75) 93   


 


1/23/20 06:37      Room Air  














                                    I & O   


 


 1/26/20 1/26/20 1/27/20





 15:00 23:00 07:00


 


Intake Total  600 ml 


 


Balance  600 ml 








Labs:





                                Laboratory Tests








Test


 1/27/20


06:47


 


White Blood Count


 9.6 x10^3/uL


(4.0-11.0)


 


Red Blood Count


 4.34 x10^6/uL


(4.30-5.70)


 


Hemoglobin


 12.9 g/dL


(13.0-17.5)  L


 


Hematocrit


 40.4 %


(39.0-53.0)


 


Mean Corpuscular Volume


 93 fL ()





 


Mean Corpuscular Hemoglobin 30 pg (25-35)  


 


Mean Corpuscular Hemoglobin


Concent 32 g/dL


(31-37)


 


Red Cell Distribution Width


 14.9 %


(11.5-14.5)  H


 


Platelet Count


 252 x10^3/uL


(140-400)


 


Neutrophils (%) (Auto) 72 % (31-73)  


 


Lymphocytes (%) (Auto) 17 % (24-48)  L


 


Monocytes (%) (Auto) 8 % (0-9)  


 


Eosinophils (%) (Auto) 3 % (0-3)  


 


Basophils (%) (Auto) 1 % (0-3)  


 


Neutrophils # (Auto)


 6.9 x10^3uL


(1.8-7.7)


 


Lymphocytes # (Auto)


 1.6 x10^3/uL


(1.0-4.8)


 


Monocytes # (Auto)


 0.8 x10^3/uL


(0.0-1.1)


 


Eosinophils # (Auto)


 0.3 x10^3/uL


(0.0-0.7)


 


Basophils # (Auto)


 0.1 x10^3/uL


(0.0-0.2)


 


Sodium Level


 151 mmol/L


(136-145)  H


 


Potassium Level


 4.4 mmol/L


(3.5-5.1)


 


Chloride Level


 115 mmol/L


()  H


 


Carbon Dioxide Level


 31 mmol/L


(21-32)


 


Anion Gap 5 (6-14)  L


 


Blood Urea Nitrogen


 52 mg/dL


(8-26)  H


 


Creatinine


 1.1 mg/dL


(0.7-1.3)


 


Estimated GFR


(Cockcroft-Gault) 64.4  





 


BUN/Creatinine Ratio 47 (6-20)  H


 


Glucose Level


 85 mg/dL


(70-99)


 


Calcium Level


 8.5 mg/dL


(8.5-10.1)


 


Total Bilirubin


 0.3 mg/dL


(0.2-1.0)


 


Aspartate Amino Transferase


(AST) 30 U/L (15-37)





 


Alanine Aminotransferase (ALT)


 22 U/L (16-63)





 


Alkaline Phosphatase


 120 U/L


()  H


 


Ammonia


 < 10 mcmol/L


(11-34)  L


 


Total Protein


 6.8 g/dL


(6.4-8.2)


 


Albumin


 3.0 g/dL


(3.4-5.0)  L


 


Albumin/Globulin Ratio


 0.8 (1.0-1.7)


L


 


Valproic Acid Level


 6 mcg/mL


()  L


 


Valproic Acid Last Dose Date 01/26/20  


 


Valproic Acid Last Dose Time 1700  











Current Medications:


Meds:





Current Medications








 Medications


  (Trade)  Dose


 Ordered  Sig/Lamberto


 Route


 PRN Reason  Start Time


 Stop Time Status Last Admin


Dose Admin


 


 Medroxyprogesterone


 Acetate


  (Depo-Provera Im)  150 mg  Q2WKS


 IM


   1/27/20 09:00


    1/27/20 12:24











I have reviewed the current psychotropics carefully including drug interactions.

 Risk benefit ratio favors no change other than as noted in my dictated progress

note.





Diagnosis:


Problems:  


(1) Dementia with behavioral disturbance


(2) Anxiety disorder


(3) Dementia, vascular, with delusions


(4) Dementia, vascular, with depression


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


(7) Impulse control disorder











NIKHIL LAUREANO MD                 Jan 27, 2020 21:38

## 2020-01-28 VITALS — SYSTOLIC BLOOD PRESSURE: 156 MMHG | DIASTOLIC BLOOD PRESSURE: 68 MMHG

## 2020-01-28 VITALS — DIASTOLIC BLOOD PRESSURE: 50 MMHG | SYSTOLIC BLOOD PRESSURE: 95 MMHG

## 2020-01-28 RX ADMIN — CARBIDOPA AND LEVODOPA SCH TAB: 25; 100 TABLET ORAL at 13:00

## 2020-01-28 RX ADMIN — NYSTATIN SCH APP: 100000 POWDER TOPICAL at 09:00

## 2020-01-28 RX ADMIN — POTASSIUM CHLORIDE SCH MEQ: 1500 TABLET, EXTENDED RELEASE ORAL at 09:00

## 2020-01-28 RX ADMIN — NYSTATIN SCH APP: 100000 POWDER TOPICAL at 19:54

## 2020-01-28 RX ADMIN — QUETIAPINE FUMARATE SCH MG: 25 TABLET, FILM COATED ORAL at 13:00

## 2020-01-28 RX ADMIN — ASPIRIN 81 MG SCH MG: 81 TABLET ORAL at 09:00

## 2020-01-28 RX ADMIN — MIRTAZAPINE SCH MG: 7.5 TABLET, FILM COATED ORAL at 19:55

## 2020-01-28 RX ADMIN — CARBIDOPA AND LEVODOPA SCH TAB: 25; 100 TABLET ORAL at 19:56

## 2020-01-28 RX ADMIN — MAGNESIUM HYDROXIDE SCH MG: 400 SUSPENSION ORAL at 19:56

## 2020-01-28 RX ADMIN — QUETIAPINE FUMARATE SCH MG: 25 TABLET, FILM COATED ORAL at 17:58

## 2020-01-28 RX ADMIN — Medication SCH CAP: at 09:00

## 2020-01-28 RX ADMIN — ACETAMINOPHEN SCH MG: 160 SOLUTION ORAL at 09:00

## 2020-01-28 RX ADMIN — MELATONIN TAB 3 MG SCH MG: 3 TAB at 19:55

## 2020-01-28 RX ADMIN — Medication SCH CAP: at 19:54

## 2020-01-28 RX ADMIN — SERTRALINE HYDROCHLORIDE SCH MG: 100 TABLET ORAL at 09:00

## 2020-01-28 RX ADMIN — MEMANTINE HYDROCHLORIDE SCH MG: 10 TABLET ORAL at 09:00

## 2020-01-28 RX ADMIN — QUETIAPINE FUMARATE SCH MG: 25 TABLET, FILM COATED ORAL at 09:00

## 2020-01-28 RX ADMIN — CARBIDOPA AND LEVODOPA SCH TAB: 25; 100 TABLET ORAL at 09:00

## 2020-01-28 RX ADMIN — NAPROXEN SCH MG: 250 TABLET ORAL at 09:00

## 2020-01-28 RX ADMIN — DIVALPROEX SODIUM SCH MG: 125 CAPSULE, COATED PELLETS ORAL at 09:00

## 2020-01-28 RX ADMIN — CARBIDOPA AND LEVODOPA SCH TAB: 25; 100 TABLET ORAL at 17:57

## 2020-01-28 RX ADMIN — NAPROXEN SCH MG: 250 TABLET ORAL at 19:55

## 2020-01-28 RX ADMIN — ACETAMINOPHEN SCH MG: 160 SOLUTION ORAL at 19:56
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
## 2020-01-28 NOTE — PN
DATE:  01/27/2020



PSYCHIATRIC PROGRESS NOTE



This late entry 01/27/2020 covers the elements not covered in my initial note.



SUBJECTIVE:  I met with the patient in the evening.  The patient slept 6-3/4

hours previous night per DERIAN Elliott.  He was agitated at night, received

Zyprexa and again this morning at 6:00 a.m. on 01/27/2020 due to agitation,

restlessness, paranoia.  Takes his medications crushed in boost.  Overall, doing

a little bit better.  Valproic acid level is 6.  Ammonia is less than 10.



REVIEW OF SYSTEMS:  No CV, , pulmonary, eye system symptoms on review. 

Reliability poor, but he is extremely hard of hearing with poor vision.



MENTAL STATUS EXAM:  Oriented to himself.  Insight, judgment, recent and remote

memory, attention, concentration, fund of knowledge poor, consistent with his

diagnosis mentioned in my initial note.



PLAN:  No change from initial note.





______________________________

MAN DELVIS LAUREANO MD



DR:  HARRY/ashly  JOB#:  [id number] / [id number]

DD:  01/28/2020 15:44  DT:  01/28/2020 23:27

## 2020-01-28 NOTE — PDOC
Exam


Note:


George Note:


Please also refer to the separate dictated note~for this date of service 

dictated separately.~Patient seen individually. Discussed the patient with 

Nursing staff reviewed the chart.~Reviewed interim history and current 

functioning. Reviewed vital signs,~Labs/ Radiology~and current medications noted

below. Continue current treatment with the changes noted in the dictated 

addendum note





Assessment:


Vital Signs/I&O:





                                   Vital Signs








  Date Time  Temp Pulse Resp B/P (MAP) Pulse Ox O2 Delivery O2 Flow Rate FiO2


 


1/28/20 15:58 98.5 55 14 156/68 (97) 98   


 


1/23/20 06:37      Room Air  














                                    I & O   


 


 1/27/20 1/27/20 1/28/20





 15:00 23:00 07:00


 


Intake Total 360 ml 120 ml 


 


Balance 360 ml 120 ml 











Current Medications:


I have reviewed the current psychotropics carefully including drug interactions.

 Risk benefit ratio favors no change other than as noted in my dictated progress

note.





Diagnosis:


Problems:  


(1) Dementia with behavioral disturbance


(2) Anxiety disorder


(3) Dementia, vascular, with delusions


(4) Dementia, vascular, with depression


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


(7) Impulse control disorder











NIKHIL LAUREANO MD                 Jan 28, 2020 21:53

## 2020-01-28 NOTE — PN
DATE:  01/25/2020



PSYCHIATRIC PROGRESS NOTE



This late entry 01/25/2020 covers elements not covered in my initial note.



SUBJECTIVE:  I met with the patient in the evening of 01/25/2020.  The patient

slept 7-3/4 hours previous night per DERIAN Noble.  He was trying to swing and

strike out at the nursing aide and was aggressive during showers, but did

redirect.



REVIEW OF SYSTEMS:  Hard of hearing, poor vision.  No CV, , pulmonary, eye

system symptoms on review.



MENTAL STATUS EXAM:  Oriented to himself.  Insight, judgment, recent and remote

memory, attention, concentration, fund of knowledge poor, consistent with his

diagnosis mentioned in my initial note.



PLAN:  No change from initial note.





______________________________

MAN EDLVIS LAUREANO MD



DR:  HARRY/ashly  JOB#:  [id number] / [id number]

DD:  01/27/2020 17:33  DT:  01/28/2020 01:33

## 2020-01-29 VITALS — SYSTOLIC BLOOD PRESSURE: 150 MMHG | DIASTOLIC BLOOD PRESSURE: 69 MMHG

## 2020-01-29 VITALS — DIASTOLIC BLOOD PRESSURE: 69 MMHG | SYSTOLIC BLOOD PRESSURE: 150 MMHG

## 2020-01-29 RX ADMIN — CARBIDOPA AND LEVODOPA SCH TAB: 25; 100 TABLET ORAL at 08:26

## 2020-01-29 RX ADMIN — NAPROXEN SCH MG: 250 TABLET ORAL at 09:00

## 2020-01-29 RX ADMIN — ASPIRIN 81 MG SCH MG: 81 TABLET ORAL at 08:26

## 2020-01-29 RX ADMIN — QUETIAPINE FUMARATE SCH MG: 25 TABLET, FILM COATED ORAL at 09:00

## 2020-01-29 RX ADMIN — CARBIDOPA AND LEVODOPA SCH TAB: 25; 100 TABLET ORAL at 09:00

## 2020-01-29 RX ADMIN — Medication SCH CAP: at 09:00

## 2020-01-29 RX ADMIN — POTASSIUM CHLORIDE SCH MEQ: 1500 TABLET, EXTENDED RELEASE ORAL at 08:28

## 2020-01-29 RX ADMIN — SERTRALINE HYDROCHLORIDE SCH MG: 100 TABLET ORAL at 08:26

## 2020-01-29 RX ADMIN — POTASSIUM CHLORIDE SCH MEQ: 1500 TABLET, EXTENDED RELEASE ORAL at 09:00

## 2020-01-29 RX ADMIN — ASPIRIN 81 MG SCH MG: 81 TABLET ORAL at 09:00

## 2020-01-29 RX ADMIN — QUETIAPINE FUMARATE SCH MG: 25 TABLET, FILM COATED ORAL at 08:28

## 2020-01-29 RX ADMIN — Medication SCH CAP: at 08:26

## 2020-01-29 RX ADMIN — ACETAMINOPHEN SCH MG: 160 SOLUTION ORAL at 08:26

## 2020-01-29 RX ADMIN — NAPROXEN SCH MG: 250 TABLET ORAL at 08:28

## 2020-01-29 RX ADMIN — SERTRALINE HYDROCHLORIDE SCH MG: 100 TABLET ORAL at 09:00

## 2020-01-29 RX ADMIN — NYSTATIN SCH APP: 100000 POWDER TOPICAL at 08:25

## 2020-01-29 RX ADMIN — ACETAMINOPHEN SCH MG: 160 SOLUTION ORAL at 09:00
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
## 2020-01-29 NOTE — PDOC
Exam


Note:


George Note:


Please also refer to the separate dictated note~for this date of service 

dictated separately.~Patient seen individually. Discussed the patient with 

Nursing staff reviewed the chart.~Reviewed interim history and current 

functioning. Reviewed vital signs,~Labs/ Radiology~and current medications noted

below. Continue current treatment with the changes noted in the dictated 

addendum note





Assessment:


Vital Signs/I&O:





                                   Vital Signs








  Date Time  Temp Pulse Resp B/P (MAP) Pulse Ox O2 Delivery O2 Flow Rate FiO2


 


1/29/20 09:00  72  150/69    


 


1/29/20 06:32 96.8  19  98   














                                    I & O   


 


 1/28/20 1/28/20 1/29/20





 15:00 23:00 07:00


 


Intake Total 0 ml 0 ml 


 


Balance 0 ml 0 ml 











Current Medications:


I have reviewed the current psychotropics carefully including drug interactions.

 Risk benefit ratio favors no change other than as noted in my dictated progress

note.





Diagnosis:


Problems:  


(1) Anxiety disorder


(2) Dementia, vascular, with delusions


(3) Dementia, vascular, with depression


(4) Dementia in Alzheimer's disease with delusions


(5) Dementia in Alzheimer's disease with depression


(6) Impulse control disorder











NIKHIL LAUREANO MD                 Jan 29, 2020 21:59

## 2020-01-30 NOTE — PN
DATE:  01/28/2020



This late entry, 01/28, covers elements not covered in my initial note.



SUBJECTIVE:  I met with the patient evening of 01/28.  Per DERIAN Bailey, the

patient slept 5-1/4 hours previous night.  He has been in bed all day, appears

weaker, somewhat dehydrated.  Labs are reflective of this.  We will defer to Dr. Schreiber.  Apparently, he is being transitioned to hospice care, but again I will

let Dr. Schreiber decide on this.  Given his tiredness withdrawal, we will stop the

Depakote, Aricept, Namenda and discontinue the IM Ativan as well.



REVIEW OF SYSTEMS:  He is hard of hearing, poor vision, tired.  No CV, ,

pulmonary, eye, ENT system symptoms on review.  Reliability poor.



MENTAL STATUS EXAM:  Oriented to himself.  Insight, judgment, recent and remote

memory, attention, concentration, fund of knowledge poor, consistent with his

diagnosis mentioned in my initial note.



PLAN:  No change from initial note other than what is noted above.





______________________________

MAN DELVIS LAUREANO MD



DR:  HARRY/ashly  JOB#:  [id number] / [id number]

DD:  01/29/2020 17:19  DT:  01/30/2020 04:34

## 2022-01-25 NOTE — PDOC
Exam


Note:


George Note:


Please also refer to the separate dictated note~for this date of service 

dictated separately.~Patient seen individually. Discussed the patient with 

Nursing staff reviewed the chart.~Reviewed interim history and current 

functioning. Reviewed vital signs,~Labs/ Radiology~and current medications noted

below. Continue current treatment with the changes noted in the dictated 

addendum note





Assessment:


Vital Signs/I&O:





                                   Vital Signs








  Date Time  Temp Pulse Resp B/P (MAP) Pulse Ox O2 Delivery O2 Flow Rate FiO2


 


1/10/20 15:44 97.4 64 20 133/62 (85) 99   


 


1/9/20 15:29      Room Air  














                                    I & O   


 


 1/9/20 1/9/20 1/10/20





 15:00 23:00 07:00


 


Intake Total 480 ml 240 ml 


 


Balance 480 ml 240 ml 








Labs:





                                Laboratory Tests








Test


 1/10/20


12:13


 


Valproic Acid Level


 12 mcg/mL


()  L


 


Valproic Acid Last Dose Date 1/9/20  


 


Valproic Acid Last Dose Time 1700  











Current Medications:


Meds:





Current Medications








 Medications


  (Trade)  Dose


 Ordered  Sig/Lamberto


 Route


 PRN Reason  Start Time


 Stop Time Status Last Admin


Dose Admin


 


 Carbidopa/Levodopa


  (Sinemet 25/100)  1 tab  TID


 PO


   1/10/20 21:00


    1/10/20 20:52











I have reviewed the current psychotropics carefully including drug interactions.

 Risk benefit ratio favors no change other than as noted in my dictated progress

note.





Diagnosis:


Problems:  


(1) Dementia with behavioral disturbance


(2) Anxiety disorder


(3) Dementia, vascular, with delusions


(4) Dementia, vascular, with depression


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


(7) Impulse control disorder











NIKHIL LAUREANO MD                 Silverio 10, 2020 21:05 Opioid Pregnancy And Lactation Text: These medications can lead to premature delivery and should be avoided during pregnancy. These medications are also present in breast milk in small amounts.

## 2024-02-21 NOTE — PN
DATE:  01/19/2020



PSYCHIATRIC PROGRESS NOTE.



This late entry of 01/19/2020 covers the elements not covered in my initial

note.



SUBJECTIVE:  I met with the patient in the evening of 01/19/2020.  Per nursing

report, the patient slept 7-1/2 hours previous night per DERIAN Morel.  He has been

combative with cares, but did not receive IM Ativan, received Zyprexa twice

p.r.n. for psychosis.  Takes his medications crushed, slept 10 in the morning.



REVIEW OF SYSTEMS:  No CV, , pulmonary, eye, ENT system symptoms on review. 

Reliability poor.



MENTAL STATUS EXAM:  Oriented to himself.  Insight, judgment, recent and remote

memory, attention, concentration, fund of knowledge poor, consistent with his

diagnosis mentioned in my initial note.



PLAN:  No change from initial note.





______________________________

MAN DELVIS LAUREANO MD



DR:  HARRY/ashly  JOB#:  [id number] / [id number]

DD:  01/20/2020 19:05  DT:  01/20/2020 23:41 Pt cleared for discharge home by MD. RICCI. Discharge and follow up instructions reviewed, pt verbalized understanding. No further questions at this time. Lidocaine, Ibuprofen, Amoxicillin prescriptions given, Rx education provided. Patient AOx4 and ambulatory out of ED.